# Patient Record
Sex: FEMALE | Race: OTHER | NOT HISPANIC OR LATINO
[De-identification: names, ages, dates, MRNs, and addresses within clinical notes are randomized per-mention and may not be internally consistent; named-entity substitution may affect disease eponyms.]

---

## 2020-08-22 PROBLEM — Z00.00 ENCOUNTER FOR PREVENTIVE HEALTH EXAMINATION: Status: ACTIVE | Noted: 2020-08-22

## 2020-08-24 ENCOUNTER — APPOINTMENT (OUTPATIENT)
Dept: ORTHOPEDIC SURGERY | Facility: CLINIC | Age: 40
End: 2020-08-24

## 2020-08-28 ENCOUNTER — APPOINTMENT (OUTPATIENT)
Dept: ORTHOPEDIC SURGERY | Facility: CLINIC | Age: 40
End: 2020-08-28
Payer: MEDICAID

## 2020-08-28 ENCOUNTER — RESULT REVIEW (OUTPATIENT)
Age: 40
End: 2020-08-28

## 2020-08-28 ENCOUNTER — OUTPATIENT (OUTPATIENT)
Dept: OUTPATIENT SERVICES | Facility: HOSPITAL | Age: 40
LOS: 1 days | End: 2020-08-28
Payer: COMMERCIAL

## 2020-08-28 PROCEDURE — 20611 DRAIN/INJ JOINT/BURSA W/US: CPT | Mod: LT

## 2020-08-28 PROCEDURE — 73564 X-RAY EXAM KNEE 4 OR MORE: CPT

## 2020-08-28 PROCEDURE — 99204 OFFICE O/P NEW MOD 45 MIN: CPT | Mod: 25

## 2020-08-28 PROCEDURE — 73564 X-RAY EXAM KNEE 4 OR MORE: CPT | Mod: 26,50

## 2020-08-28 NOTE — HISTORY OF PRESENT ILLNESS
[de-identified] : The patient is a 39 year old woman presenting with left knee pain.\par \par The patient presents with a several month history of atraumatic, left lateral knee pain.  She recalls a remote sporting injury during childhood, but did not require surgery.  The patient denies new precipitating injury or trauma.  She works as a .  She initially went to an Orthopedist in [place], and had MRI imaging (not available), and had a cortisone injection which provider temporary relief without complication.  The patient denies distal numbness, weakness, paresthesias.\par \par Pain is moderate in intensity, described as sharp, improved with rest, worse with walking.\par

## 2020-08-28 NOTE — DISCUSSION/SUMMARY
[Medication Risks Reviewed] : Medication risks reviewed [de-identified] : The patient is a 39 year old woman presenting with a several month history of atraumatic, left lateral knee pain.  Her pain is likely secondary to knee osteoarthritis, mainly lateral compartment.\par \par Imaging/Diagnostics were interpreted and results were reviewed with the patient in detail.  All questions were answered appropriately.\par \par I discussed the treatment of degenerative arthritis with the patient at length today. I described the spectrum of treatment from nonoperative modalities to total joint arthroplasty. Noninvasive and nonoperative treatment modalities include weight reduction, activity modification with low impact exercise, PRN use of acetaminophen or anti-inflammatory medication if tolerated, natural supplements such as glucosamine/chondroitin, and physical therapy. Further treatments can include corticosteroid injection, hyaluronic acid injections, and orthobiologic such as PRP. Definitive treatment can certainly include total joint arthroplasty, but patient would require surgical consultation to discuss that option further. The risks and benefits of each treatment options was discussed and all questions were answered.\par \par After informed consent, and explanation of risks, benefits, alternatives, adverse effects of injection, which includes but is not limited to infection, bleeding, allergic reaction, swelling, soft tissue weakening/tendon rupture, injection site complication, fat atrophy, skin depigmentation, failure to improve symptoms, the patient would like to proceed with the procedure - LEFT KNEE ULTRASOUND-GUIDED CORTICOSTEROID INJECTION. See procedure note above. Patient tolerated the procedure well. The patient was provided with postinjection instructions.\par \par Patient was prescribed a course of physical therapy today.  The patient was also provided some general home exercises.  The patient was counseled on activity modification.\par \par She was given prescription for lateral  brace.\par \par She understands that she may require surgery in the future.\par \par We will trial course of HA injections.  Orthovisc injections ordered today for LEFT knee.\par \par ------------------------------------------------------------------------------------------------------------------\par Patient appreciates and agrees with current plan.\par \par This note was generated using a mixture of manual typing and dragon medical dictation software.  A reasonable effort has been made for proofreading its contents, but typos may still remain.  If there are any questions or points of clarification needed please notify my office.\par \par >45 minutes of time was spent on total encounter.  >50% of the visit was spent on counseling/coordination of care and medical-decision making for this patient.\par \par \par

## 2020-08-28 NOTE — PHYSICAL EXAM
[de-identified] : General: Well-nourished, well-developed, alert, and in no acute distress, OBESE\par Head: Normocephalic.\par Eyes: Pupils equal round reactive to light and accommodation, extraocular muscles intact, normal sclera.\par Nose: No nasal discharge.\par Cardiac: Regular rate. Extremities are warm and well perfused. Distal pulses are symmetric bilaterally.\par Respiratory: No labored breathing.\par Extremities: Sensation is intact distally bilaterally.  Distal pulses are symmetric bilaterally\par Lymphatic: No regional lymphadenopathy, no lymphedema\par Neurologic: No focal deficits\par Skin: Normal skin color, texture, and turgor\par Psychiatric: Normal affect\par MSK: as noted above/below\par \par RIGHT KNEE:\par \par Inspection: no bruising, swelling, erythema\par Joint Effusion:no \par ROM: Knee Flexion 130 , Knee Extension 0\par Palpation:No pain at joint line, patellar tendon, MFC/LFC, Medial/Lateral Tibial Plateau\par Leg Length Discrepancy:no\par Patella: no apprehension\par Distal Pulses: normal\par Lower Extremity Strength:normal, 5/5 \par Lower Extremity Reflexes:normal, 2+\par Lower Extremity Sensation: normal\par \par Special Tests:\par [facility]:Negative \par Garett: Negative\par Anterior Drawer:Negative\par Anterior Lachman:Negative\par Varus/Valgus:Negative, no instability\par \par LEFT KNEE:\par \par Inspection: no bruising, erythema\par Joint Effusion:TRACE \par ROM: Knee Flexion 120 DEGREES WITH PAIN AT END FLEXION , Knee Extension 0\par Palpation:LATERAL JOINT LINE PAIN, MILD MEDIAL JOINT LINE PAIN, PATELLAR FACET TENDERNESS\par Leg Length Discrepancy:no\par Patella: no apprehension, +COMPRESSION\par Distal Pulses: normal\par Lower Extremity Strength:normal, 5/5 \par Lower Extremity Reflexes:normal, 2+\par Lower Extremity Sensation: normal\par \par Special Tests:\par [facility]:POSITIVE LATERALLY\par Garett: Negative\par Anterior Drawer:Negative\par Posterior Drawer:Negative \par Varus/Valgus:Negative, no instability\par \par \par  [de-identified] : Xray Bilateral Knees - Multiple views were reviewed with the patient in detail. \par \par \par IMPRESSION: Frontal, lateral, flexion and patellar views of the right knee are negative for fracture or dislocation. Joint spaces are preserved. \par \par Frontal, lateral, flexion and patellar views of the left knee demonstrate severe narrowing of the lateral compartment with periarticular osteophytosis. No fracture. No dislocation. \par \par

## 2020-08-28 NOTE — PROCEDURE
[de-identified] : Ultrasound-Guided LEFT Knee Injection\par \par Indication for U/S Guidance: Ensure placement within the tibiofemoral joint for diagnostic purposes, while avoiding neurovascular structures\par \par Indication for Injection: KNEE OSTEOARTHRITIS\par \par A discussion was had with the patient regarding this procedure and all questions were answered. All risks, benefits and alternatives were discussed. These included but were not limited to bleeding, infection, and allergic reaction. A timeout was done to ensure correct side and pt agreed to the procedure. Betadine was used to sterilize and prep the area, and alcohol was used to clean the skin in the anterior aspect of the knee joint. The suprapatellar space was visualized utilizing the Sonosite, linear transducer. The joint was visualized in the short axis and an in-plane approach was used for the injection. Ultrasound guidance was utilized to ensure accuracy of the intra-articular injection and avoid the neurovascular structures. A 25-gauge 1.5" needle was used to inject 4cc of 1% lidocaine without epi into the SubQ.  This was followed by injection with 1CC OF BETAMETHASONE.  A sterile bandage was then applied. The patient tolerated the procedure well and there were no complications.\par

## 2020-10-09 ENCOUNTER — APPOINTMENT (OUTPATIENT)
Dept: ORTHOPEDIC SURGERY | Facility: CLINIC | Age: 40
End: 2020-10-09
Payer: MEDICAID

## 2020-10-09 PROCEDURE — 99213 OFFICE O/P EST LOW 20 MIN: CPT

## 2020-10-12 NOTE — HISTORY OF PRESENT ILLNESS
[de-identified] : The patient is a 39 year old woman presenting for follow-up for left knee pain.\par \par The patient was last seen about 6 weeks ago for a several month history of atraumatic, left lateral knee pain.  She was found to have knee osteoarthritis and had a cortisone injection.  HA injections were not covered by her insurance.  Her pain symptoms improved moderately with cortisone injection.  She was not able to use the  brace.\par \par Previous History: She recalls a remote sporting injury during childhood, but did not require surgery.  The patient denies new precipitating injury or trauma.  She works as a .  She initially went to an Orthopedist in [place], and had MRI imaging (not available), and had a cortisone injection which provider temporary relief without complication.  The patient denies distal numbness, weakness, paresthesias.\par \par Pain is moderate in intensity, described as sharp, improved with rest, worse with walking.\par

## 2020-10-12 NOTE — PHYSICAL EXAM
[de-identified] : General: Well-nourished, well-developed, alert, and in no acute distress, OBESE\par Head: Normocephalic.\par Eyes: Pupils equal round reactive to light and accommodation, extraocular muscles intact, normal sclera.\par Nose: No nasal discharge.\par Cardiac: Regular rate. Extremities are warm and well perfused. Distal pulses are symmetric bilaterally.\par Respiratory: No labored breathing.\par Extremities: Sensation is intact distally bilaterally.  Distal pulses are symmetric bilaterally\par Lymphatic: No regional lymphadenopathy, no lymphedema\par Neurologic: No focal deficits\par Skin: Normal skin color, texture, and turgor\par Psychiatric: Normal affect\par MSK: as noted above/below\par \par RIGHT KNEE:\par \par Inspection: no bruising, swelling, erythema\par Joint Effusion:no \par ROM: Knee Flexion 130 , Knee Extension 0\par Palpation:No pain at joint line, patellar tendon, MFC/LFC, Medial/Lateral Tibial Plateau\par Leg Length Discrepancy:no\par Patella: no apprehension\par Distal Pulses: normal\par Lower Extremity Strength:normal, 5/5 \par Lower Extremity Reflexes:normal, 2+\par Lower Extremity Sensation: normal\par \par Special Tests:\par [facility]:Negative \par Garett: Negative\par Anterior Drawer:Negative\par Anterior Lachman:Negative\par Varus/Valgus:Negative, no instability\par \par LEFT KNEE:\par \par Inspection: no bruising, erythema\par Joint Effusion:TRACE \par ROM: Knee Flexion 130 DEGREES WITH PAIN AT END FLEXION , Knee Extension 0\par Palpation:LATERAL JOINT LINE PAIN, MILD MEDIAL JOINT LINE PAIN, PATELLAR FACET TENDERNESS\par Leg Length Discrepancy:no\par Patella: no apprehension\par Distal Pulses: normal\par Lower Extremity Strength:normal, 5/5 \par Lower Extremity Reflexes:normal, 2+\par Lower Extremity Sensation: normal\par \par Special Tests:\par [facility]:POSITIVE LATERALLY\par Garett: Negative\par Anterior Drawer:Negative\par Posterior Drawer:Negative \par Varus/Valgus:Negative, no instability\par \par \par

## 2020-10-12 NOTE — DISCUSSION/SUMMARY
[Medication Risks Reviewed] : Medication risks reviewed [de-identified] : The patient is a 39 year old woman presenting with a several month history of atraumatic, left lateral knee pain.  Her pain is likely secondary to knee osteoarthritis, mainly lateral compartment.\par \par Imaging/Diagnostics were interpreted and results were reviewed with the patient in detail.  All questions were answered appropriately.\par \par We again discussed nonoperative and operative treatment options.  Patient understands that she may require surgery in the future.\par \par Patient was prescribed a course of physical therapy today.  The patient was also provided some general home exercises.  The patient was counseled on activity modification.\par \par She was given prescription for hinged knee brace.\par \par Patient was prescribed a short course of Meloxicam .Appropriate use of medication was reviewed with the patient in detail. Risks, benefits, and adverse effects medication were discussed.\par \par I informed her that it is too soon to consider repeat cortisone injection.  She may consider in 6 weeks.\par \par \par ------------------------------------------------------------------------------------------------------------------\par Patient appreciates and agrees with current plan.\par \par This note was generated using a mixture of manual typing and dragon medical dictation software.  A reasonable effort has been made for proofreading its contents, but typos may still remain.  If there are any questions or points of clarification needed please notify my office.\par \par >15 minutes of time was spent on total encounter.  >50% of the visit was spent on counseling/coordination of care and medical-decision making for this patient.\par \par \par

## 2020-11-13 ENCOUNTER — APPOINTMENT (OUTPATIENT)
Dept: ORTHOPEDIC SURGERY | Facility: CLINIC | Age: 40
End: 2020-11-13
Payer: MEDICAID

## 2020-11-13 VITALS
TEMPERATURE: 98 F | HEIGHT: 60 IN | HEART RATE: 90 BPM | WEIGHT: 160 LBS | OXYGEN SATURATION: 98 % | SYSTOLIC BLOOD PRESSURE: 130 MMHG | DIASTOLIC BLOOD PRESSURE: 80 MMHG | BODY MASS INDEX: 31.41 KG/M2

## 2020-11-13 PROCEDURE — 99072 ADDL SUPL MATRL&STAF TM PHE: CPT

## 2020-11-13 PROCEDURE — 20611 DRAIN/INJ JOINT/BURSA W/US: CPT | Mod: LT

## 2020-11-13 RX ORDER — HYALURONATE SODIUM 30 MG/2 ML
30 SYRINGE (ML) INTRAARTICULAR
Qty: 3 | Refills: 0 | Status: DISCONTINUED | COMMUNITY
Start: 2020-08-28 | End: 2020-11-13

## 2020-11-13 NOTE — PROCEDURE
[de-identified] : Ultrasound-Guided LEFT Knee Injection\par \par Indication for U/S Guidance: Ensure placement within the tibiofemoral joint for diagnostic purposes, while avoiding neurovascular structures\par \par Indication for Injection: Knee Osteoarthritis\par \par A discussion was had with the patient regarding this procedure and all questions were answered. All risks, benefits and alternatives were discussed. These included but were not limited to bleeding, infection, and allergic reaction. A timeout was done to ensure correct side and pt agreed to the procedure. Betadine was used to sterilize and prep the area, and alcohol was used to clean the skin in the anterior aspect of the knee joint. The suprapatellar space was visualized utilizing the Sonosite, linear transducer. The joint was visualized in the short axis and an in-plane approach was used for the injection. Ultrasound guidance was utilized to ensure accuracy of the intra-articular injection and avoid the neurovascular structures. A 25-gauge 1.5" needle was used to inject 2cc of 1% lidocaine without epi into the SubQ.  This was followed by injection with 1cc of 1% lidocaine without epinephrine, 1cc of 0.5% bupivicaine and 1cc of KENALOG.. A sterile bandage was then applied. The patient tolerated the procedure well and there were no complications.\par \par \par

## 2021-02-08 ENCOUNTER — APPOINTMENT (OUTPATIENT)
Dept: ORTHOPEDIC SURGERY | Facility: CLINIC | Age: 41
End: 2021-02-08

## 2021-02-16 ENCOUNTER — APPOINTMENT (OUTPATIENT)
Dept: ORTHOPEDIC SURGERY | Facility: CLINIC | Age: 41
End: 2021-02-16

## 2021-02-18 ENCOUNTER — APPOINTMENT (OUTPATIENT)
Dept: ORTHOPEDIC SURGERY | Facility: CLINIC | Age: 41
End: 2021-02-18

## 2021-02-22 ENCOUNTER — APPOINTMENT (OUTPATIENT)
Dept: ORTHOPEDIC SURGERY | Facility: CLINIC | Age: 41
End: 2021-02-22

## 2021-02-25 ENCOUNTER — APPOINTMENT (OUTPATIENT)
Dept: ORTHOPEDIC SURGERY | Facility: CLINIC | Age: 41
End: 2021-02-25
Payer: MEDICAID

## 2021-02-25 PROCEDURE — 99072 ADDL SUPL MATRL&STAF TM PHE: CPT

## 2021-02-25 PROCEDURE — 20610 DRAIN/INJ JOINT/BURSA W/O US: CPT | Mod: LT

## 2021-02-25 PROCEDURE — 99212 OFFICE O/P EST SF 10 MIN: CPT | Mod: 25

## 2021-02-25 NOTE — PHYSICAL EXAM
[de-identified] : General: Well-nourished, well-developed, alert, and in no acute distress, OBESE\par Head: Normocephalic.\par Eyes: Pupils equal round reactive to light and accommodation, extraocular muscles intact, normal sclera.\par Nose: No nasal discharge.\par Cardiac: Regular rate. Extremities are warm and well perfused. Distal pulses are symmetric bilaterally.\par Respiratory: No labored breathing.\par Extremities: Sensation is intact distally bilaterally.  Distal pulses are symmetric bilaterally\par Lymphatic: No regional lymphadenopathy, no lymphedema\par Neurologic: No focal deficits\par Skin: Normal skin color, texture, and turgor\par Psychiatric: Normal affect\par MSK: as noted above/below\par \par RIGHT KNEE:\par \par Inspection: no bruising, swelling, erythema\par Joint Effusion:no \par ROM: Knee Flexion 130 , Knee Extension 0\par Palpation:No pain at joint line, patellar tendon, MFC/LFC, Medial/Lateral Tibial Plateau\par Leg Length Discrepancy:no\par Patella: no apprehension\par Distal Pulses: normal\par Lower Extremity Strength:normal, 5/5 \par Lower Extremity Reflexes:normal, 2+\par Lower Extremity Sensation: normal\par \par Special Tests:\par [facility]:Negative \par Garett: Negative\par Anterior Drawer:Negative\par Anterior Lachman:Negative\par Varus/Valgus:Negative, no instability\par \par LEFT KNEE:\par \par Inspection: no bruising, erythema\par Joint Effusion:TRACE \par ROM: Knee Flexion 130 DEGREES WITH PAIN AT END FLEXION , Knee Extension 0\par Palpation:LATERAL JOINT LINE PAIN, MILD MEDIAL JOINT LINE PAIN, PATELLAR FACET TENDERNESS\par Leg Length Discrepancy:no\par Patella: no apprehension\par Distal Pulses: normal\par Lower Extremity Strength:normal, 5/5 \par Lower Extremity Reflexes:normal, 2+\par Lower Extremity Sensation: normal\par \par Special Tests:\par [facility]:POSITIVE LATERALLY\par Garett: Negative\par Anterior Drawer:Negative\par Posterior Drawer:Negative \par Varus/Valgus:Negative, no instability\par \par \par

## 2021-02-25 NOTE — PROCEDURE
[de-identified] : Injection: Left knee joint.\par Indication: Osteoarthritis.\par \par A discussion was had with the patient regarding this procedure and all questions were answered. All risks, benefits and alternatives were discussed. These included but were not limited to bleeding, infection, allergic reaction and reaccumulation of fluid. A timeout was done to ensure correct side and pt agreed to the procedure.  Alcohol was used to clean the skin, and betadine was used to sterilize and prep the area in the lateral joint line aspect of the knee. Ethyl chloride spray was then used as a topical anesthetic. A 22-gauge needle was used to inject 2cc of 1% lidocaine without epinephrine and 1cc of  KENALOGinto the knee. A sterile bandage was then applied. The patient tolerated the procedure well.\par

## 2021-02-25 NOTE — DISCUSSION/SUMMARY
[de-identified] : The patient is a 39 year old woman presenting with a several month history of atraumatic, left lateral knee pain.  Her pain is likely secondary to knee osteoarthritis, mainly lateral compartment.\par \par We again discussed the natural progression of knee OA, and treatment options.  Patient understands that she may require surgery in the future.\par \par After informed consent, and explanation of risks, benefits, alternatives, adverse effects of injection, which includes but is not limited to infection, bleeding, allergic reaction, swelling, soft tissue weakening/tendon rupture, injection site complication, fat atrophy, skin depigmentation, failure to improve symptoms, the patient would like to proceed with the procedure - LEFT KNEE CORTICOSTEROID INJECTION. See procedure note above. Patient tolerated the procedure well. The patient was provided with postinjection instructions.\par \par She was counseled on activity modification, weight loss, bracing, NSAIDs PRN.\par \par Patient was prescribed a refill of Meloxicam.Appropriate use of medication was reviewed with the patient in detail. Risks, benefits, and adverse effects medication were discussed.\par \par Follow-up in 2-3 months.\par \par \par ------------------------------------------------------------------------------------------------------------------\par Patient appreciates and agrees with current plan.\par \par This note was generated using a mixture of manual typing and dragon medical dictation software.  A reasonable effort has been made for proofreading its contents, but typos may still remain.  If there are any questions or points of clarification needed please notify my office.\par \par >15 minutes of time was spent on total encounter.  >50% of the visit was spent on counseling/coordination of care and medical-decision making for this patient.\par \par \par

## 2021-02-25 NOTE — HISTORY OF PRESENT ILLNESS
[de-identified] : The patient is a 39 year old woman presenting for follow-up for left knee pain.\par \par The patient was last seen about 4 months ago for a several month history of atraumatic, left lateral knee pain.  She was found to have knee osteoarthritis and had a cortisone injection.  HA injections were not covered by her insurance.  Her pain symptoms improved moderately with cortisone injection.  We discussed PT, hinged knee brace, occasional NSAIDs.  She has not been using the knee brace.\par \par Previous History: She recalls a remote sporting injury during childhood, but did not require surgery.  The patient denies new precipitating injury or trauma.  She works as a .  She initially went to an Orthopedist in [place], and had MRI imaging (not available), and had a cortisone injection which provider temporary relief without complication.  The patient denies distal numbness, weakness, paresthesias.\par \par Pain is moderate in intensity, described as sharp, improved with rest, worse with walking.\par

## 2021-05-04 ENCOUNTER — APPOINTMENT (OUTPATIENT)
Dept: ORTHOPEDIC SURGERY | Facility: CLINIC | Age: 41
End: 2021-05-04

## 2021-07-06 ENCOUNTER — APPOINTMENT (OUTPATIENT)
Dept: ORTHOPEDIC SURGERY | Facility: CLINIC | Age: 41
End: 2021-07-06
Payer: MEDICAID

## 2021-07-06 VITALS
WEIGHT: 160 LBS | HEIGHT: 60 IN | SYSTOLIC BLOOD PRESSURE: 120 MMHG | OXYGEN SATURATION: 97 % | BODY MASS INDEX: 31.41 KG/M2 | HEART RATE: 97 BPM | DIASTOLIC BLOOD PRESSURE: 70 MMHG | TEMPERATURE: 98.7 F

## 2021-07-06 PROCEDURE — 20611 DRAIN/INJ JOINT/BURSA W/US: CPT | Mod: LT

## 2021-07-06 PROCEDURE — 99212 OFFICE O/P EST SF 10 MIN: CPT | Mod: 25

## 2021-07-06 NOTE — PHYSICAL EXAM
[de-identified] : General: Well-nourished, well-developed, alert, and in no acute distress, OBESE\par Head: Normocephalic.\par Eyes: Pupils equal round reactive to light and accommodation, extraocular muscles intact, normal sclera.\par Nose: No nasal discharge.\par Cardiac: Regular rate. Extremities are warm and well perfused. Distal pulses are symmetric bilaterally.\par Respiratory: No labored breathing.\par Extremities: Sensation is intact distally bilaterally.  Distal pulses are symmetric bilaterally\par Lymphatic: No regional lymphadenopathy, no lymphedema\par Neurologic: No focal deficits\par Skin: Normal skin color, texture, and turgor\par Psychiatric: Normal affect\par MSK: as noted above/below\par \par RIGHT KNEE:\par \par Inspection: no bruising, swelling, erythema\par Joint Effusion:no \par ROM: Knee Flexion 130 , Knee Extension 0\par Palpation:No pain at joint line, patellar tendon, MFC/LFC, Medial/Lateral Tibial Plateau\par Leg Length Discrepancy:no\par Patella: no apprehension\par Distal Pulses: normal\par Lower Extremity Strength:normal, 5/5 \par Lower Extremity Reflexes:normal, 2+\par Lower Extremity Sensation: normal\par \par Special Tests:\par [facility]:Negative \par Garett: Negative\par Anterior Drawer:Negative\par Anterior Lachman:Negative\par Varus/Valgus:Negative, no instability\par \par LEFT KNEE:\par \par Inspection: no bruising, erythema\par Joint Effusion:TRACE \par ROM: Knee Flexion 130 DEGREES WITH PAIN AT END FLEXION , Knee Extension 0\par Palpation:LATERAL JOINT LINE PAIN, MILD MEDIAL JOINT LINE PAIN, PATELLAR FACET TENDERNESS\par Leg Length Discrepancy:no\par Patella: no apprehension\par Distal Pulses: normal\par Lower Extremity Strength:normal, 5/5 \par Lower Extremity Reflexes:normal, 2+\par Lower Extremity Sensation: normal\par \par Special Tests:\par [facility]:POSITIVE LATERALLY\par Garett: Negative\par Anterior Drawer:Negative\par Posterior Drawer:Negative \par Varus/Valgus:Negative, no instability\par \par \par

## 2021-07-06 NOTE — PROCEDURE
[de-identified] : Ultrasound-Guided LEFT Knee Injection\par \par Indication for U/S Guidance: Ensure placement within the tibiofemoral joint for diagnostic purposes, while avoiding neurovascular structures\par \par Indication for Injection: KNEE OSTEOARTHRITIS\par \par A discussion was had with the patient regarding this procedure and all questions were answered. All risks, benefits and alternatives were discussed. These included but were not limited to bleeding, infection, and allergic reaction. A timeout was done to ensure correct side and pt agreed to the procedure. Betadine was used to sterilize and prep the area, and alcohol was used to clean the skin in the anterior aspect of the knee joint. The suprapatellar space was visualized utilizing the Sonosite, linear transducer. The joint was visualized in the short axis and an in-plane approach was used for the injection. Ultrasound guidance was utilized to ensure accuracy of the intra-articular injection and avoid the neurovascular structures. A 22-gauge 1.5" needle was used to inject 2cc of 1% lidocaine without epi into the SubQ.  This was followed by injection with 2cc of 1% lidocaine without epi, 0.5% bupivicaine and 1CC OF KENALOG.  A sterile bandage was then applied. The patient tolerated the procedure well and there were no complications.\par \par \par \par

## 2021-07-06 NOTE — DISCUSSION/SUMMARY
[de-identified] : The patient is a 39 year old woman presenting with chronic, atraumatic, left lateral knee pain.  Her pain is likely secondary to knee osteoarthritis, mainly lateral compartment.\par \par The patient was counseled on the natural progression of the problem(s) today and potential complications of diagnoses.  The patient was provided reassurance today.\par \par We again discussed nonoperative and operative treatment options for knee OA.  \par \par Patient understands that they may require surgery in the future.\par \par After informed consent, and explanation of risks, benefits, alternatives, adverse effects of injection, which includes but is not limited to infection, bleeding, allergic reaction, swelling, soft tissue weakening/tendon rupture, injection site complication, fat atrophy, skin depigmentation, failure to improve symptoms, the patient would like to proceed with the procedure - LEFT KNEE ULTRASOUND-GUIDED CORTICOSTEROID INJECTION. See procedure note above. Patient tolerated the procedure well. The patient was provided with postinjection instructions.\par \par Follow-up in 3-6 months or PRN.\par ------------------------------------------------------------------------------------------------------------------\par Patient appreciates and agrees with current plan.\par \par This note was generated using a mixture of manual typing and dragon medical dictation software.  A reasonable effort has been made for proofreading its contents, but typos may still remain.  If there are any questions or points of clarification needed please notify my office.\par \par >15 minutes of time was spent on total encounter.  >50% of the visit was spent on counseling/coordination of care and medical-decision making for this patient.\par \par \par

## 2021-07-06 NOTE — HISTORY OF PRESENT ILLNESS
[de-identified] : The patient is a 39 year old woman presenting for follow-up for left knee pain.\par \par The patient was last seen about 5 months ago for chronic, atraumatic, left lateral knee pain.  She was found to have knee osteoarthritis and has had cortisone injections in the past.  HA injections were not covered by her insurance.  Her pain symptoms improved moderately with cortisone injection.  We discussed PT, hinged knee brace, occasional NSAIDs.  She has not been using the knee brace.\par \par Previous History: She recalls a remote sporting injury during childhood, but did not require surgery.  The patient denies new precipitating injury or trauma.  She works as a .  She initially went to an Orthopedist in [place], and had MRI imaging (not available), and had a cortisone injection which provider temporary relief without complication.  The patient denies distal numbness, weakness, paresthesias.\par \par Pain is moderate in intensity, described as sharp, improved with rest, worse with walking.\par

## 2021-07-13 ENCOUNTER — OUTPATIENT (OUTPATIENT)
Dept: OUTPATIENT SERVICES | Facility: HOSPITAL | Age: 41
LOS: 1 days | End: 2021-07-13
Payer: COMMERCIAL

## 2021-07-13 ENCOUNTER — APPOINTMENT (OUTPATIENT)
Dept: ORTHOPEDIC SURGERY | Facility: CLINIC | Age: 41
End: 2021-07-13
Payer: MEDICAID

## 2021-07-13 ENCOUNTER — RESULT REVIEW (OUTPATIENT)
Age: 41
End: 2021-07-13

## 2021-07-13 VITALS
HEART RATE: 89 BPM | SYSTOLIC BLOOD PRESSURE: 128 MMHG | WEIGHT: 160 LBS | BODY MASS INDEX: 31.41 KG/M2 | DIASTOLIC BLOOD PRESSURE: 70 MMHG | HEIGHT: 60 IN | TEMPERATURE: 97.8 F | OXYGEN SATURATION: 97 %

## 2021-07-13 PROCEDURE — 73030 X-RAY EXAM OF SHOULDER: CPT

## 2021-07-13 PROCEDURE — 20611 DRAIN/INJ JOINT/BURSA W/US: CPT | Mod: RT

## 2021-07-13 PROCEDURE — 99212 OFFICE O/P EST SF 10 MIN: CPT | Mod: 25

## 2021-07-13 PROCEDURE — 73030 X-RAY EXAM OF SHOULDER: CPT | Mod: 26,RT

## 2021-07-13 NOTE — PROCEDURE
[de-identified] : Ultrasound Guided Injection \par Indication: Ensure placement within the subacromial-subdeltoid space,  without damaging the tendons\par \par Utlizing the [email or web address] portable ultrasound machine, the Linear transducer, sterilized probe, using ultrasound guidance with the probe in the longitudinal axis, utilizing an in plane approach, was used for the following injection:\par \par Injection: RIGHT SUBACROMIAL-SUBDELTOID SPACE INJECTION\par Indication: SUBACROMIAL BURSITIS\par \par A discussion was had with the patient regarding this procedure and all questions were answered. All risks, benefits and alternatives were discussed. These included but were not limited to bleeding, infection, and allergic reaction. A timeout was performed prior to the procedure to ensure proper side.  Utilizing ultrasound guidance, the subacromial space was visualized.  Alcohol was used to clean and sterilize the skin over the anterior aspect of the shoulder. A 25-gauge needle was used to inject 4cc of lidocaine 1% without epinephrine and 1cc of DEPOMEDROL into the subacromial-subdeltoid space.   A sterile bandage was then applied. The patient tolerated the procedure well and there were no complications.\par

## 2021-07-13 NOTE — PHYSICAL EXAM
[de-identified] : General: Well-nourished, well-developed, alert, and in no acute distress.\par Head: Normocephalic.\par Eyes: Pupils equal round reactive to light and accommodation, extraocular muscles intact, normal sclera.\par Nose: No nasal discharge.\par Cardiac: Regular rate. Extremities are warm and well perfused. Distal pulses are symmetric bilaterally.\par Respiratory: No labored breathing.\par Extremities: Sensation is intact distally bilaterally.  Distal pulses are symmetric bilaterally\par Lymphatic: No regional lymphadenopathy, no lymphedema\par Neurologic: No focal deficits\par Skin: Normal skin color, texture, and turgor\par Psychiatric: Normal affect\par MSK: as noted above/below\par \par \par \par RIGHT SHOULDER:\par  \par Inspection:no bruising, rash, erythema, deformity\par Joint Effusion:no\par ROM:MILD DECREASED, FF/ABDUCTION ~160, ER ~70, IR TO HIP\par Palpation: PAIN AT SUBACROMIAL AREA, PAIN AT GH JOINT, NO AC joint pain, Bicipital Groove Pain , Clavicle pain , GT pain \par Distal Pulses:normal\par Upper Extremity Reflexes:2+\par Shoulder Strength: 5/5 \par Upper Extremity Sensation: normal\par \par Special Tests:\par Empty Can: PAINFUL\par Cross Arm: POSITIVE\par Neer: POSITIVE\par Griffin: POSITIVE\par Speed: Negative\par \par \par LEFT SHOULDER:\par  \par Inspection:no bruising, rash, erythema, deformity\par Joint Effusion:no\par ROM:normal Forward Flexion, Abduction , Internal Rotation: , External Rotation \par Palpation: NO AC joint pain, Bicipital Groove Pain , GH joint pain , Clavicle pain , GT pain \par Distal Pulses:normal\par Upper Extremity Reflexes:2+\par Shoulder Strength: 5/5 \par Upper Extremity Sensation: normal\par \par Special Tests:\par Empty Can: Negative \par Lift-Off Test: Negative \par Cross Arm: Negative\par Neer: Negative\par Griffin: Negative\par Speed: Negative\par \par  [de-identified] : Xray RIGHT Shoulder - Multiple views were reviewed with the patient in detail.  There is no acute fracture or dislocation.  There is no joint effusion.  Joint spaces are preserved. We will await formal radiology reading.\par \par

## 2021-07-13 NOTE — HISTORY OF PRESENT ILLNESS
[de-identified] : The patient is a 40 year old, rhd woman presenting with right shoulder pain.\par \par She is currently unemployed.\par \par She presents with chronic, atraumatic right shoulder pain.  The patient denies precipitating injury or trauma.  She recalls seeing Orthopedics about 3 months ago, with diagnosis of tendinitis.  The patient denies distal numbness, weakness, paresthesias.  Pain is worse when sleeping on the affected side.  She tried PT short term, but did not dedicate enough time to PT.  NSAIDs have not improved pain significantly.\par \par Pain is moderate in intensity, described as sharp, improved with rest, worse with movement.

## 2021-07-13 NOTE — REASON FOR VISIT
[Initial Visit] : an initial visit for [Shoulder Pain] : shoulder pain [Time Spent: ____ minutes] : Total time spent using  services: [unfilled] minutes. The patient's primary language is not English thus required  services. [FreeTextEntry1] : [id number] and [id number] [FreeTextEntry2] :  1 Ping.  2 Jett. [FreeTextEntry3] : Patient was seen by Dr. Farris twice in one visit. Two different interpreters. [TWNoteComboBox1] : cO

## 2021-07-13 NOTE — DISCUSSION/SUMMARY
[Medication Risks Reviewed] : Medication risks reviewed [de-identified] : The patient is a 40 year old, rhd woman presenting with chronic right shoulder pain.  Suspect subacromial bursitis/impingement.  DDx also includes adhesive capsulitis.\par \par Imaging/Diagnostics/Medical Records were interpreted and/or reviewed and results were reviewed with the patient in detail.  All questions were answered appropriately.\par \par The patient was counseled on the natural progression of the problem(s) today and potential complications of diagnoses.  The patient was provided reassurance today.\par \par After informed consent, and explanation of risks, benefits, alternatives, adverse effects of injection, which includes but is not limited to infection, bleeding, allergic reaction, swelling, soft tissue weakening/tendon rupture, injection site complication, fat atrophy, skin depigmentation, failure to improve symptoms, the patient would like to proceed with the procedure - RIGHT SUBACROMIAL BURSA ULTRASOUND-GUIDED CORTICOSTEROID INJECTION. See procedure note above. Patient tolerated the procedure well. The patient was provided with postinjection instructions.\par \par \par Patient was prescribed a course of physical therapy today.  The patient was also provided some general home exercises.  The patient was counseled on activity modification.\par \par Follow-up in 6 weeks.\par \par ------------------------------------------------------------------------------------------------------------------\par Patient appreciates and agrees with current plan.\par \par The patient's diagnosis above was evaluated by me, personally.  Diagnostic Testing and treatment options were discussed with the patient in detail.  The risks/benefits/potential complications of diagnostic testing/treatments were described in detail.  \par \par This note was generated using a mixture of manual typing and dragon medical dictation software.  A reasonable effort has been made for proofreading its contents, but typos may still remain.  If there are any questions or points of clarification needed please notify my office.\par \par \par >15 minutes of time was spent on total encounter.  >50% of the visit was spent on face-to-face counseling/coordination of care and medical-decision making for this patient.\par \par

## 2021-09-27 NOTE — REASON FOR VISIT
Patient has appointment on 10/19/2021 with Dr Croft for a follow up at 1pm   [FreeTextEntry1] : [id number] [FreeTextEntry2] : Jett [TWNoteComboBox1] : Oc

## 2022-01-24 ENCOUNTER — APPOINTMENT (OUTPATIENT)
Dept: ORTHOPEDIC SURGERY | Facility: CLINIC | Age: 42
End: 2022-01-24
Payer: MEDICAID

## 2022-01-24 VITALS
BODY MASS INDEX: 31.41 KG/M2 | HEART RATE: 83 BPM | WEIGHT: 160 LBS | OXYGEN SATURATION: 97 % | DIASTOLIC BLOOD PRESSURE: 76 MMHG | HEIGHT: 60 IN | SYSTOLIC BLOOD PRESSURE: 130 MMHG | TEMPERATURE: 97.8 F

## 2022-01-24 DIAGNOSIS — Z86.39 PERSONAL HISTORY OF OTHER ENDOCRINE, NUTRITIONAL AND METABOLIC DISEASE: ICD-10-CM

## 2022-01-24 DIAGNOSIS — M17.12 UNILATERAL PRIMARY OSTEOARTHRITIS, LEFT KNEE: ICD-10-CM

## 2022-01-24 DIAGNOSIS — Z78.9 OTHER SPECIFIED HEALTH STATUS: ICD-10-CM

## 2022-01-24 PROCEDURE — 20611 DRAIN/INJ JOINT/BURSA W/US: CPT | Mod: LT

## 2022-01-24 PROCEDURE — 99212 OFFICE O/P EST SF 10 MIN: CPT | Mod: 25

## 2022-01-24 RX ORDER — MELOXICAM 15 MG/1
15 TABLET ORAL
Qty: 30 | Refills: 1 | Status: COMPLETED | COMMUNITY
Start: 2020-10-09 | End: 2022-01-24

## 2022-01-24 NOTE — DISCUSSION/SUMMARY
[Medication Risks Reviewed] : Medication risks reviewed [de-identified] : The patient is a 40 year old, rhd woman presenting with chronic left knee pain secondary to knee osteoarthritis of the lateral compartment.\par \par She also complains of chronic right shoulder pain.  Likely multifactorial secondary to subacromial impingement and adhesive capsulitis.\par \par Overall, patient has poor insight into her diagnoses despite prolonged discussions regarding diagnoses/prognosis/treatment options.\par \par The patient was counseled on the natural progression of the problem(s) today and potential complications of diagnoses.  The patient was provided reassurance today.\par \par After informed consent, and explanation of risks, benefits, alternatives, adverse effects of injection, which includes but is not limited to infection, bleeding, allergic reaction, swelling, soft tissue weakening/tendon rupture, neurovascular injury, injection site complication, fat atrophy, skin depigmentation, failure to improve symptoms, the patient would like to proceed with the procedure - LEFT KNEE ULTRASOUND-GUIDED CORTICOSTEROID INJECTION. See procedure note above. Patient tolerated the procedure well. The patient was provided with postinjection instructions.\par \par Patient advised to monitor glucose carefully over next 1-2 weeks.\par \par If stable, RTC in 2 weeks for potential injection of right shoulder.\par \par She is not seeking surgery at this time.\par \par ------------------------------------------------------------------------------------------------------------------\par Patient appreciates and agrees with current plan.\par \par The patient's diagnosis above was evaluated by me, personally.  Diagnostic Testing and treatment options were discussed with the patient in detail.  The risks/benefits/potential complications of diagnostic testing/treatments were described in detail.  \par \par This note was generated using a mixture of manual typing and dragon medical dictation software.  A reasonable effort has been made for proofreading its contents, but typos may still remain.  If there are any questions or points of clarification needed please notify my office.\par \par \par >15 minutes of time was spent on total encounter.  >50% of the visit was spent on face-to-face counseling/coordination of care and medical-decision making for this patient.\par \par

## 2022-01-24 NOTE — PROCEDURE
[de-identified] : Ultrasound-Guided LEFT Knee Injection\par \par Indication for U/S Guidance: Ensure placement within the tibiofemoral joint for diagnostic purposes, while avoiding neurovascular structures\par \par Indication for Injection: KNEE OSTEOARTHRITIS\par \par A discussion was had with the patient regarding this procedure and all questions were answered. All risks, benefits and alternatives were discussed. These included but were not limited to bleeding, infection, and allergic reaction. A timeout was done to ensure correct side and pt agreed to the procedure. Betadine was used to sterilize and prep the area, and alcohol was used to clean the skin in the anterior aspect of the knee joint. The suprapatellar space was visualized utilizing the [employer], linear transducer. The joint was visualized in the short axis and an in-plane approach was used for the injection. Ultrasound guidance was utilized to ensure accuracy of the intra-articular injection and avoid the neurovascular structures. A 22-gauge 1.5" needle was used to inject 2cc of 1% lidocaine without epi into the SubQ. This was followed by injection with 2cc of 1% lidocaine without epi, 0.5% bupivicaine and 0.5cc of betamethasone. A sterile bandage was then applied. The patient tolerated the procedure well and there were no complications.

## 2022-01-24 NOTE — HISTORY OF PRESENT ILLNESS
[de-identified] : The patient is a 40 year old, rhd woman presenting for follow-up for right shoulder pain and left knee pain.\par \par She is currently unemployed.\par \par She was last seen about 6 months ago for chronic, atraumatic right shoulder pain.  She recalls seeing Orthopedics in the past, with diagnosis of tendinitis.  The patient denies distal numbness, weakness, paresthesias.  Pain is worse when sleeping on the affected side.  She tried PT short term, but did not dedicate enough time to PT.  NSAIDs have not improved pain significantly.\par \par At her last visit, she had subacromial bursa CSI, and we discussed starting PT.\par \par She also has history of left knee lateral compartment osteoarthritis.  She has tried CSI in the past which provide several months of relief.  She has not been complaint with PT or bracing.  HA injections not covered by her insurance.

## 2022-01-24 NOTE — PHYSICAL EXAM
[de-identified] : General: Well-nourished, well-developed, alert, and in no acute distress.\par Head: Normocephalic.\par Eyes: Pupils equal round reactive to light and accommodation, extraocular muscles intact, normal sclera.\par Nose: No nasal discharge.\par Cardiac: Regular rate. Extremities are warm and well perfused. Distal pulses are symmetric bilaterally.\par Respiratory: No labored breathing.\par Extremities: Sensation is intact distally bilaterally.  Distal pulses are symmetric bilaterally\par Lymphatic: No regional lymphadenopathy, no lymphedema\par Neurologic: No focal deficits\par Skin: Normal skin color, texture, and turgor\par Psychiatric: Normal affect\par MSK: as noted above/below\par \par \par \par RIGHT SHOULDER:\par  \par Inspection:no bruising, rash, erythema, deformity\par Joint Effusion:no\par ROM:MILD DECREASED, FF/ABDUCTION ~160, ER ~70, IR TO HIP\par Palpation: PAIN AT SUBACROMIAL AREA, PAIN AT GH JOINT, NO AC joint pain, Bicipital Groove Pain , Clavicle pain , GT pain \par Distal Pulses:normal\par Upper Extremity Reflexes:2+\par Shoulder Strength: 5/5 \par Upper Extremity Sensation: normal\par \par Special Tests:\par Empty Can: PAINFUL\par Cross Arm: POSITIVE\par Neer: POSITIVE\par Griffin: POSITIVE\par Speed: Negative\par \par \par LEFT SHOULDER:\par  \par Inspection:no bruising, rash, erythema, deformity\par Joint Effusion:no\par ROM:normal Forward Flexion, Abduction , Internal Rotation: , External Rotation \par Palpation: NO AC joint pain, Bicipital Groove Pain , GH joint pain , Clavicle pain , GT pain \par Distal Pulses:normal\par Upper Extremity Reflexes:2+\par Shoulder Strength: 5/5 \par Upper Extremity Sensation: normal\par \par Special Tests:\par Empty Can: Negative \par Lift-Off Test: Negative \par Cross Arm: Negative\par Neer: Negative\par Griffin: Negative\par Speed: Negative\par \par RIGHT KNEE:\par \par Inspection: no bruising, swelling, erythema\par Joint Effusion:no \par ROM: Knee Flexion 130 , Knee Extension 0\par Palpation:No pain at joint line, patellar tendon, MFC/LFC, Medial/Lateral Tibial Plateau\par Leg Length Discrepancy:no\par Patella: no apprehension\par Distal Pulses: normal\par Lower Extremity Strength:normal, 5/5 \par Lower Extremity Reflexes:normal, 2+\par Lower Extremity Sensation: normal\par \par Special Tests:\par Kim:Negative \par Garett: Negative\par Anterior Drawer:Negative\par Anterior Lachman:Negative\par Varus/Valgus:Negative, no instability\par \par LEFT KNEE:\par \par Inspection: no bruising, erythema\par Joint Effusion:TRACE \par ROM: Knee Flexion 130 DEGREES WITH PAIN AT END FLEXION , Knee Extension 0\par Palpation:LATERAL JOINT LINE PAIN, MILD MEDIAL JOINT LINE PAIN, PATELLAR FACET TENDERNESS\par Leg Length Discrepancy:no\par Patella: no apprehension\par Distal Pulses: normal\par Lower Extremity Strength:normal, 5/5 \par Lower Extremity Reflexes:normal, 2+\par Lower Extremity Sensation: normal\par \par Special Tests:\par [facility]:POSITIVE LATERALLY\par Garett: Negative\par Anterior Drawer:Negative\par Posterior Drawer:Negative \par Varus/Valgus:Negative, no instability\par

## 2022-02-07 ENCOUNTER — APPOINTMENT (OUTPATIENT)
Dept: ORTHOPEDIC SURGERY | Facility: CLINIC | Age: 42
End: 2022-02-07
Payer: MEDICAID

## 2022-02-07 VITALS
HEART RATE: 57 BPM | HEIGHT: 60 IN | WEIGHT: 140 LBS | DIASTOLIC BLOOD PRESSURE: 70 MMHG | BODY MASS INDEX: 27.48 KG/M2 | SYSTOLIC BLOOD PRESSURE: 102 MMHG | OXYGEN SATURATION: 98 %

## 2022-02-07 PROCEDURE — 99212 OFFICE O/P EST SF 10 MIN: CPT | Mod: 25

## 2022-02-07 PROCEDURE — 20611 DRAIN/INJ JOINT/BURSA W/US: CPT | Mod: RT

## 2022-02-07 NOTE — PROCEDURE
[de-identified] : Ultrasound Guided Injection \par Indication: Ensure placement within the subacromial-subdeltoid space, without damaging the tendons\par \par Utlizing the [employer] portable ultrasound machine, the Linear transducer, sterilized probe, using ultrasound guidance with the probe in the longitudinal axis, utilizing an in plane approach, was used for the following injection:\par \par Injection: RIGHT SUBACROMIAL-SUBDELTOID SPACE INJECTION\par Indication: SUBACROMIAL BURSITIS\par \par A discussion was had with the patient regarding this procedure and all questions were answered. All risks, benefits and alternatives were discussed. These included but were not limited to bleeding, infection, and allergic reaction. A timeout was performed prior to the procedure to ensure proper side. Utilizing ultrasound guidance, the subacromial space was visualized. Alcohol was used to clean and sterilize the skin over the anterior aspect of the shoulder. A 25-gauge needle was used to inject 4cc of lidocaine 1% without epinephrine, 1cc of 0.25% bupivicaine and 1cc of DEPOMEDROL into the subacromial-subdeltoid space. A sterile bandage was then applied. The patient tolerated the procedure well and there were no complications.

## 2022-02-07 NOTE — REASON FOR VISIT
[Follow-Up Visit] : a follow-up visit for [Shoulder Pain] : shoulder pain [Interpreters_IDNumber] : [id number] [Interpreters_FullName] : Cayden [TWNoteComboBox1] : Oc

## 2022-02-07 NOTE — DISCUSSION/SUMMARY
[Medication Risks Reviewed] : Medication risks reviewed [de-identified] : The patient is a 40 year old, rhd woman presenting with chronic right shoulder pain.  Likely multifactorial secondary to subacromial impingement/bursitis\par \par Overall, patient has poor insight into her diagnoses despite prolonged discussions regarding diagnoses/prognosis/treatment options.\par \par The patient was counseled on the natural progression of the problem(s) today and potential complications of diagnoses.  The patient was provided reassurance today.\par \par She is not seeking surgery at this time.\par \par Advised her of the importantace of an exercise program and highlighted exercises today.\par \par After informed consent, and explanation of risks, benefits, alternatives, adverse effects of injection, which includes but is not limited to infection, bleeding, allergic reaction, swelling, soft tissue weakening/tendon rupture, neurovascular injury, injection site complication, fat atrophy, skin depigmentation, failure to improve symptoms, the patient would like to proceed with the procedure - RIGHT SUBACROMIAL BURSA ULTRASOUND-GUIDED CORTICOSTEROID INJECITON. See procedure note above. Patient tolerated the procedure well. The patient was provided with postinjection instructions.\par \par Follow-up in 3 months.\par \par ------------------------------------------------------------------------------------------------------------------\par Patient appreciates and agrees with current plan.\par \par The patient's diagnosis above was evaluated by me, personally.  Diagnostic Testing and treatment options were discussed with the patient in detail.  The risks/benefits/potential complications of diagnostic testing/treatments were described in detail.  \par \par This note was generated using a mixture of manual typing and dragon medical dictation software.  A reasonable effort has been made for proofreading its contents, but typos may still remain.  If there are any questions or points of clarification needed please notify my office.\par \par \par >15 minutes of time was spent on total encounter.  >50% of the visit was spent on face-to-face counseling/coordination of care and medical-decision making for this patient.\par \par

## 2022-02-07 NOTE — PHYSICAL EXAM
[de-identified] : General: Well-nourished, well-developed, alert, and in no acute distress.\par Head: Normocephalic.\par Eyes: Pupils equal round reactive to light and accommodation, extraocular muscles intact, normal sclera.\par Nose: No nasal discharge.\par Cardiac: Regular rate. Extremities are warm and well perfused. Distal pulses are symmetric bilaterally.\par Respiratory: No labored breathing.\par Extremities: Sensation is intact distally bilaterally.  Distal pulses are symmetric bilaterally\par Lymphatic: No regional lymphadenopathy, no lymphedema\par Neurologic: No focal deficits\par Skin: Normal skin color, texture, and turgor\par Psychiatric: Normal affect\par MSK: as noted above/below\par \par \par \par RIGHT SHOULDER:\par  \par Inspection:no bruising, rash, erythema, deformity\par Joint Effusion:no\par ROM:MILD DECREASED, FF/ABDUCTION ~160, ER ~70, IR TO HIP\par Palpation: PAIN AT SUBACROMIAL AREA, PAIN AT GH JOINT, NO AC joint pain, Bicipital Groove Pain , Clavicle pain , GT pain \par Distal Pulses:normal\par Upper Extremity Reflexes:2+\par Shoulder Strength: 5/5 \par Upper Extremity Sensation: normal\par \par Special Tests:\par Empty Can: NEGATIVE\par Cross Arm: POSITIVE\par Neer: POSITIVE\par Griffin: POSITIVE\par Speed: Negative\par \par \par LEFT SHOULDER:\par  \par Inspection:no bruising, rash, erythema, deformity\par Joint Effusion:no\par ROM:normal Forward Flexion, Abduction , Internal Rotation: , External Rotation \par Palpation: NO AC joint pain, Bicipital Groove Pain , GH joint pain , Clavicle pain , GT pain \par Distal Pulses:normal\par Upper Extremity Reflexes:2+\par Shoulder Strength: 5/5 \par Upper Extremity Sensation: normal\par \par Special Tests:\par Empty Can: Negative \par Lift-Off Test: Negative \par Cross Arm: Negative\par Neer: Negative\par Griffin: Negative\par Speed: Negative\par \par

## 2022-02-07 NOTE — HISTORY OF PRESENT ILLNESS
[de-identified] : The patient is a 40 year old, rhd woman presenting for follow-up for right shoulder pain\par \par She is currently unemployed.\par \par She was last seen about 6 months ago for chronic, atraumatic right shoulder pain.  She recalls seeing Orthopedics in the past, with diagnosis of tendinitis.  The patient denies distal numbness, weakness, paresthesias.  Pain is worse when sleeping on the affected side.  She tried PT short term, but did not dedicate enough time to PT.  NSAIDs have not improved pain significantly.\par \par At her last visit, she had subacromial bursa CSI, and we discussed starting PT.  Symptoms recurred without precipitating injury.\par \par 
Abdomen soft, nontender, nondistended, bowel sounds present in all 4 quadrants.

## 2022-05-02 ENCOUNTER — APPOINTMENT (OUTPATIENT)
Dept: ORTHOPEDIC SURGERY | Facility: CLINIC | Age: 42
End: 2022-05-02

## 2022-05-31 ENCOUNTER — APPOINTMENT (OUTPATIENT)
Dept: ORTHOPEDIC SURGERY | Facility: CLINIC | Age: 42
End: 2022-05-31

## 2022-06-07 ENCOUNTER — APPOINTMENT (OUTPATIENT)
Dept: ORTHOPEDIC SURGERY | Facility: CLINIC | Age: 42
End: 2022-06-07

## 2023-02-13 ENCOUNTER — APPOINTMENT (OUTPATIENT)
Dept: PHYSICAL MEDICINE AND REHAB | Facility: CLINIC | Age: 43
End: 2023-02-13
Payer: MEDICAID

## 2023-02-13 VITALS
SYSTOLIC BLOOD PRESSURE: 111 MMHG | DIASTOLIC BLOOD PRESSURE: 79 MMHG | HEART RATE: 101 BPM | HEIGHT: 60 IN | BODY MASS INDEX: 27.48 KG/M2 | WEIGHT: 140 LBS | OXYGEN SATURATION: 97 %

## 2023-02-13 PROCEDURE — 73564 X-RAY EXAM KNEE 4 OR MORE: CPT | Mod: RT

## 2023-02-13 PROCEDURE — 99205 OFFICE O/P NEW HI 60 MIN: CPT

## 2023-02-13 NOTE — DATA REVIEWED
[Plain X-Rays] : plain X-Rays [FreeTextEntry1] : Weight bearing x-rays of the RIGHT knees (AP, flexion weight bearing, lateral, and merchant views) demonstrate no significant joint space narrowing except in the patellofemoral compartment, neg fracture or dislocation, +narrowing in the patellofemoral joint with lateral patellar tilt.\par

## 2023-02-13 NOTE — PHYSICAL EXAM
[FreeTextEntry1] : GEN: NAD, well dressed, well nourished\par HEENT: NCAT, oropharynx clear\par CV: S1S2, RRR\par RESP: on room air, no labored breathing\par ABD: non distended\par EXT: well perfused, no calf tenderness\par \par RIGHT KNEE:\par no scars\par trace effusion\par no laceration\par no increased warmth\par no erythema\par no varus deformity\par no valgus deformity\par absent J sign\par \par ROM\par Full range of motion in extension, no flexion contracture\par No added AROM/PROM with knee extension\par Nearly full range of motion in flexion, 0-120\par \par Palpation\par + crepitus\par neg TTP over the quadriceps tendon\par neg TTP over the base of the patella\par ++TTP over the apex of the patella\par neg TTP over medial border of the patella\par neg TTP over lateral border of the patella\par +TTP over the medial joint line\par +TTP over the lateral joint line\par neg TTP over pes anserine area in the medial face of the tibia\par neg TTP over tibial tuberosity\par neg TTP over fibular head\par neg TTP over the popliteal fossa\par + TTP over the patellar tendon\par Manual Muscle Testing\par 4/5 in all planes with resisted isometric stress\par \par +laxity with varus stress with the knee extended at 0\par +laxity with varus stress with the knee extended at 30\par neg laxity with valgus stress with the knee extended at 0\par neg laxity with valgus stress with the knee extended at 30\par neg Lachmann Test\par neg Anterior drawer\par neg Posterior drawer\par neg Campa’s Sign\par neg Patellar apprehension\par neg Apley’s Rotation-Distraction Test (increased rotation, ligamentous) vs contralateral limb\par neg Apley’s Rotation-Compression Test (decreased rotation, meniscal) vs contralateral limb\par neg Arsenio Test with Tibia Externally Rotated (MM)\par neg Arsenio Test with Tibia Internally Rotated (LM)\par \par Sensation intact to light touch over all aspects of the right lower leg\par Distal pulses intact\par \par LEFT KNEE:\par no scars\par no effusion\par no laceration\par no increased warmth\par no erythema\par no varus deformity\par no valgus deformity\par absent  J sign\par \par ROM\par Full range of motion in extension, no flexion contracture\par No added AROM/PROM with knee extension\par Full range of motion in flexion, 0-135\par \par Palpation\par + crepitus\par neg TTP over the quadriceps tendon\par neg TTP over the base of the patella\par neg TTP over the apex of the patella\par neg TTP over medial border of the patella\par neg TTP over lateral border of the patella\par neg TTP over the medial joint line\par neg TTP over the lateral joint line\par neg TTP over pes anserine area in the medial face of the tibia\par neg TTP over tibial tuberosity\par neg TTP over fibular head\par neg TTP over the popliteal fossa\par \par Manual Muscle Testing\par 5/5 in all planes with resisted isometric stress\par \par neg laxity with varus stress with the knee extended at 0\par neg laxity with varus stress with the knee extended at 30\par neg laxity with valgus stress with the knee extended at 0\par neg laxity with valgus stress with the knee extended at 30\par neg Lachmann Test\par neg Anterior drawer\par neg Posterior drawer\par neg Campa’s Sign\par neg Patellar apprehension\par neg Apley’s Rotation-Distraction Test (increased rotation, ligamentous) vs contralateral limb\par neg Apley’s Rotation-Compression Test (decreased rotation, meniscal) vs contralateral limb\par neg Arsenio Test with Tibia Externally Rotated (MM)\par neg Arsenio Test with Tibia Internally Rotated (LM)\par neg Thessaly Test\par \par Sensation intact to light touch over all aspects of the right lower leg\par Distal pulses intact\par \par + Antalgic gait\par \par

## 2023-02-13 NOTE — HISTORY OF PRESENT ILLNESS
[FreeTextEntry1] : Salty Swift M.D.\par Sports Medicine and Interventional Spine\par Department of Physical Medicine and Rehabilitation \par [facility] \par Email: [email or web address] <[email or web address]>\par \par [facility]\par [facility] [place] [facility]\par [address]\par [address], [address] Floor\[place] [address]\par \par [facility]\par [facility] [place] at [facility]\par [address], 4th Floor\[place] [address]\par \par [facility] at \par [facility]\par [address], 6th Floor\[place] [address]\par \par [facility] at [facility]\par [address] [facility]\[place] [address]\par \par [facility] [place] \par [place] [facility] at [facility]\par [address] [facility], [address]\[place] [address]\par \par For [place] Appointments\par Phone: [phone]\par Fax: [phone]\par \par For [place] Appointments\par Phone: [phone]\par Fax: [phone]\par \par \par ----------------------------------------------------------------------------------------------------------------------------------------\par \par PATIENT: DEEPTI RDZ \par MRN: [id number] \par YOB: 1980 \par DATE OF VISIT: 02/13/2023 \par Referred by ZURI FERMIN\par PCP ADDRESS:\par \par Feb 13, 2023 \par \par \par Dear  \par \par Thank you for referring DEEPTI RDZ to my Sports and Interventional Spine practice and office. Enclosed is a copy of the patient's consultation/progress note, which includes my complete assessment and recent studies completed during the patient's evaluation.\par \par If you have questions or have any patients who require nonsurgical, non-opiate management of any sports, spine, or musculoskeletal conditions, please do not hesitate to contact my , Ashley Liu at [phone].\par \par I look forward to taking care of your patients along with you.\par \par Sincerely,\par \par Salty\par \par Salty Swift MD\par Sports, Interventional Spine, & Regenerative Musculoskeletal Medicine\par [facility] [place] [facility]\par Email: [email or web address]\par \par \par                                                   Initial Consultation:\par CC: right knee pain \par \par HPI:  This is the first visit to [facility]'s [facility] [place] [facility] Sports Medicine and Interventional Spine Practice.  \par \par DEEPTI RDZ presents with the chief complaint as above.  \par \par Initial Hx on 02/13/2023 :\par Presents in person to [place]\par patient reports no injury or inciting event for the right knee\par patient reports worsening pain over the past several months, now has difficulty with their job due to the knee pain\par patient has been to their PCP, using recommended ointment to the right knee\par pain occurs over the anterior right knee over the medial joint line\par The patient’s difficulties began 6 months ago  \par The pain is graded as moderate to severe\par The pain is described as sharp\par The pain is intermittent\par The pain does not radiate\par The patient feels that the pain is overall persistent\par Patient denies other recent fall, MVA, injury, trauma, or accident besides presenting history above\par \par Patient suffers from diabetes, usually 200-250 in the morning\par \par Aggravating: ambulation, prolonged sitting, prolonged standing, navigating stairs, sit to stand transitional movements, getting out of bed\par Alleviating: rest, activity modification, avoiding prolonged walking, pharmacologic treatments\par \par Meds: denies regular PO pain medications\par Therapy Program: no recent structured targeted therapy program\par HEP: doing HEP regularly\par \par Assoc Sx:\par Denies numbness, Tingling\par Denies Focal motor weakness in the upper or lower limbs\par Denies New or worsened bowel or bladder incontinence\par Denies Saddle anesthesia\par Denies Buckling\par Denies Using Orthotic(s)/Supportive devices\par Denies Swelling in the upper/lower extremities\par Denies Clicking\par They also deny frequent tripping, falling\par \par ROS: A 14 point review of systems was completed. Positive findings are pain as described above. The remaining systems negative.\par \par COVID HX: reviewed\par \par Assoc Hx:\par Ambulates without assistive device\par Injection Hx: denies locally directed treatment to the area in question\par Imaging Hx: reviewed\par \par Level of functioning: indep with ambulation, indep with ADLs\par Living Situation: dwelling with steps to enter\par \par

## 2023-02-13 NOTE — ASSESSMENT
[FreeTextEntry1] :                                                       Assessment/Plan:\par \par DEEPTI RDZ is a 42 year female with acute on chronic right knee pain here for initial consultation.\par \par Patellofemoral arthritis, right\par Patellofemoral pain syndrome, right\par Decreased range of motion of knee, right\par Weakness of the hip, right\par \par - Tiers of treatment and management of above diagnosis(es) were discussed with patient\par - Optimal diet, weight, sleep, and lifestyle management to minimize stress and maximize well being counseling provided\par - Imaging reviewed and discussed with patient\par - Reviewed previous encounter notes from 2/7/2022 Dr. JEFFREY Farris ([employer] [facility])\par - Patient was advised to start a structured, targeted therapy program 2-3x/wk for 8 wks with goal toward HEP\par - Patient was educated on an appropriate home exercise program, provided with exercise recommendations, all questions answered\par - Patient was advised to start Meloxicam 15 mg daily with food/milk for 5-7 days to help with pain and to decrease inflammation, afterwards as needed\par - Patient may trial topical diclofenac 1% gel QID to the site of their worst pain for the next 7 to 10 days to help with pain and relieve inflammation. Afterwards, they were advised to use only as needed.\par \par - Patient was advised to apply cool compresses or warm heat to affected regions PRN\par - Radiographs of knee ordered and reviewed with patient today\par \par - Educated about red flag symptoms including (but not limited to) new, worsened, or persistent: fever greater than 100F, bowel or bladder incontinence, bowel obstipation, inability to void urine, urinary leakage, Severe nausea or vomiting, Worsening numbness, worsening tingling/paresthesias, and/or new or progressive motor weakness; advised to seek immediate medical attention at his nearest Emergency department should they experience any of the above\par \par - Follow up in 2-3 weeks in person\par \par I have personally spent a total of at least 65 minutes preparing, reviewing internal and external records, explaining, counseling, and coordinating care for this patient encounter.\par \par Thank you, ZURI FERMIN, for allowing me to participate in the care of your patient. Please do not hesitate to contact me with questions/concerns.\par \par Salty Swift M.D.\par Sports and Interventional Spine\par Department of Physical Medicine and Rehabilitation \par [facility] \par Email: [email or web address]\par \par [facility]\par Orthopaedic [facility]\par [address]\par [facility], 11th Floor\[place] [address]\par \par Appointments: [phone]\par Fax: [phone]\par

## 2023-03-07 ENCOUNTER — RESULT REVIEW (OUTPATIENT)
Age: 43
End: 2023-03-07

## 2023-03-07 ENCOUNTER — APPOINTMENT (OUTPATIENT)
Dept: PHYSICAL MEDICINE AND REHAB | Facility: CLINIC | Age: 43
End: 2023-03-07
Payer: MEDICAID

## 2023-03-07 ENCOUNTER — OUTPATIENT (OUTPATIENT)
Dept: OUTPATIENT SERVICES | Facility: HOSPITAL | Age: 43
LOS: 1 days | End: 2023-03-07
Payer: COMMERCIAL

## 2023-03-07 VITALS
HEART RATE: 100 BPM | HEIGHT: 60 IN | BODY MASS INDEX: 30.43 KG/M2 | OXYGEN SATURATION: 96 % | DIASTOLIC BLOOD PRESSURE: 75 MMHG | WEIGHT: 155 LBS | SYSTOLIC BLOOD PRESSURE: 114 MMHG

## 2023-03-07 PROCEDURE — 73030 X-RAY EXAM OF SHOULDER: CPT

## 2023-03-07 PROCEDURE — 73030 X-RAY EXAM OF SHOULDER: CPT | Mod: 26,RT

## 2023-03-07 PROCEDURE — 99215 OFFICE O/P EST HI 40 MIN: CPT | Mod: 25

## 2023-03-07 PROCEDURE — 20610 DRAIN/INJ JOINT/BURSA W/O US: CPT | Mod: RT

## 2023-03-16 NOTE — PROCEDURE
[de-identified] : PROCEDURE: Corticosteroid injection of knee, right\par \par Patient: DEEPTI RDZ \par MRN #: [id number]\par : 1980          					\par Date: 2023  \par \par Physician: Salty Swift MD\par \par Joint	Knee\par Side	RIGHT\par Substance	Corticosteroid\par \par Procedure note: The patient was positioned sitting at the edge of the examination table.  After risks, benefits and alternatives were discussed, the patient signed informed consent. Infero-lateral border of the patella was identified by palpation and indented with a needle cap without trauma to the skin. The inferolateral knee region was then prepped with chloraprep. Topical anesthesia spray was then applied to the inferolateral knee. A 25G 1.5" needle was then inserted into the joint. After negative aspiration, mixture of 2 mLof 1% lidocaine and 1mL of kenalog was then injected into the joint's inferolateral approach. Excellent flow of fluid was noted in the joint space.  There was no bleeding or complications.  Performed on the right knee then on the left. The patient tolerated the procedure well.\par \par Lidocaine 1%\par Lot :[id number]\par Exp Date: 10/2025\par \par triamcinolone \par 40mg/mL\par Lot [id number]\par Exp Date: 2024\par

## 2023-03-16 NOTE — ASSESSMENT
[FreeTextEntry1] :                                                       Assessment/Plan:\par \par DEEPTI RDZ is a 42 year female with acute on chronic right knee pain here for follow up visit\par \par Patellofemoral arthritis, right\par Patellofemoral pain syndrome, right\par Decreased range of motion of knee, right\par Weakness of the hip, right\par \par - Tolerated CSI to the right knee in office today\par - Tiers of treatment and management of above diagnosis(es) were discussed with patient\par - Optimal diet, weight, sleep, and lifestyle management to minimize stress and maximize well being counseling provided\par - Imaging reviewed and discussed with patient\par - Reviewed previous encounter notes from 2/7/2022 Dr. JEFFREY Farris ([facility])\par - Patient was advised to start a structured, targeted therapy program 2-3x/wk for 8 wks with goal toward HEP\par - Patient was educated on an appropriate home exercise program, provided with exercise recommendations, all questions answered\par - Patient was advised to use Meloxicam 15 mg daily with food/milk AS needed only for pain, refil sent\par - Patient may continue to trial topical diclofenac 1% gel QID to the site of their worst pain for the next 7 to 10 days to help with pain and relieve inflammation. Afterwards, they were advised to use only as needed.\par - Patient was advised to apply cool compresses or warm heat to affected regions PRN\par \par - Educated about red flag symptoms including (but not limited to) new, worsened, or persistent: fever greater than 100F, bowel or bladder incontinence, bowel obstipation, inability to void urine, urinary leakage, Severe nausea or vomiting, Worsening numbness, worsening tingling/paresthesias, and/or new or progressive motor weakness; advised to seek immediate medical attention at his nearest Emergency department should they experience any of the above\par \par - Follow up in 2 months in person after completing therapy program\par \par I have personally spent a total of at least 50 minutes preparing, reviewing internal and external records, explaining, counseling, and coordinating care for this patient encounter.\par \par Thank you, ZURI FERMIN, for allowing me to participate in the care of your patient. Please do not hesitate to contact me with questions/concerns.\par \par Salty Swift M.D.\par Sports and Interventional Spine\par Department of Physical Medicine and Rehabilitation \par [facility] \par Email: [email or web address]\par \par [facility]\par Orthopaedic [facility]\par [address]\par [facility], 11th Floor\[place] [address]\par \par Appointments: [phone]\par Fax: [phone]\par

## 2023-03-16 NOTE — HISTORY OF PRESENT ILLNESS
[FreeTextEntry1] : Salty Swift M.D.\par Sports Medicine and Interventional Spine\par Department of Physical Medicine and Rehabilitation \par [facility] \par Email: [email or web address] <[email or web address]>\par \par [facility]\par Orthopaedic [place] [facility]\par [address]\par [address], [address]th Floor\[place] [address]\par \par [facility]\par [facility] [place] at [facility]\par [address], 4th Floor\[place] [address]\par \par [facility] at \par [facility]\par [address], 6th Floor\[place] [address]\par \par [facility] at [facility]\par [address] [facility]\[place] [address]\par \par [facility]ician [facility] [place] \par [facility] [facility] at [facility]\par [address] [facility], [address]\[place] [address]\par \par For [place] Appointments\par Phone: [phone]\par Fax: [phone]\par \par For [place] Appointments\par Phone: [phone]\par Fax: [phone]\par \par \par ----------------------------------------------------------------------------------------------------------------------------------------\par \par PATIENT: DEEPTI RDZ \par MRN: [id number] \par YOB: 1980 \par DATE OF VISIT: 03/07/2023\par Referred by ZURI FERMIN\par PCP ADDRESS:\par \par Mar 07, 2023 \par \par \par Dear  \par \par Thank you for referring DEEPTI RDZ to my Sports and Interventional Spine practice and office. Enclosed is a copy of the patient's consultation/progress note, which includes my complete assessment and recent studies completed during the patient's evaluation.\par \par If you have questions or have any patients who require nonsurgical, non-opiate management of any sports, spine, or musculoskeletal conditions, please do not hesitate to contact my , Ashley Liu at [phone].\par \par I look forward to taking care of your patients along with you.\par \par Sincerely,\par \par Salty\par \par Salty Swift MD\par Sports, Interventional Spine, & Regenerative Musculoskeletal Medicine\par [facility] [place] at [facility]\par Email: [email or web address]\par \par \par                                                   Follow Up Visit\par CC: right knee pain \par \par HPI:  This is a follow up visit to [facility] [place] [facility] Sports Medicine and Interventional Spine Practice.  \par \par DEEPTI RDZ presents with the chief complaint as above.  \par \par Interval Hx on Mar 07, 2023: presents for follow up.  [id number] Oli. Since last visit, they continue to have right > left knee pain, worse with standing, worse with stair climbing, patient has relief with taking breaks. Patient started PT since their last visit, patient has had >4 sessions at [facility] in [place], [place]; also having relief with medication and PT treatments.  There have been no significant changes to their aggravating or alleviating factors since the last visit. Pharmacologic treatments now include OTC analgesics PRN, but otherwise pharmacologic treatments are minimal. Denies new or worsened numbness, tingling, or focal motor deficit. Denies interval fall, accident, or injury. Patient relates that her AM blood glucose has been better controlled 130-135 most of the time recently in the AM, taking insulin daily but does not check her levels.  Patient reports increasing frequency of severe pain as well as increasingly severe intensity of pain. Patient reports having difficulty with ambulation indoors due to pain and stiffness from the right knee. Patient reports difficulty with everyday activities including reaching down/bending forward for lower body dressing, toileting, hygiene, and transferring about surfaces within her home for sitting, resting.\par \par Meds: denies regular PO pain medications\par Therapy Program: no recent structured targeted therapy program\par HEP: doing HEP regularly\par \par Assoc Sx:\par Denies numbness, Tingling\par Denies Focal motor weakness in the upper or lower limbs\par Denies New or worsened bowel or bladder incontinence\par Denies Saddle anesthesia\par Denies Buckling\par Denies Using Orthotic(s)/Supportive devices\par Denies Swelling in the upper/lower extremities\par Denies Clicking\par They also deny frequent tripping, falling\par \par ROS: A 14 point review of systems was completed. Positive findings are pain as described above. The remaining systems negative.\par \par COVID HX: reviewed\par \par Initial Hx on 02/13/2023: Presents in person to [facility]. patient reports no injury or inciting event for the right knee. patient reports worsening pain over the past several months, now has difficulty with their job due to the knee pain. patient has been to their PCP, using recommended ointment to the right knee. pain occurs over the anterior right knee over the medial joint line. The patient’s difficulties began 6 months ago. The pain is graded as moderate to severe. The pain is described as sharp. The pain is intermittent. The pain does not radiate. The patient feels that the pain is overall persistent. Patient denies other recent fall, MVA, injury, trauma, or accident besides presenting history above. Patient suffers from diabetes, usually 200-250 in the morning. Aggravating: ambulation, prolonged sitting, prolonged standing, navigating stairs, sit to stand transitional movements, getting out of bed\par Alleviating: rest, activity modification, avoiding prolonged walking, pharmacologic treatments\par \par Assoc Hx:\par Ambulates without assistive device\par Injection Hx: denies locally directed treatment to the area in question\par Imaging Hx: reviewed\par \par Level of functioning: indep with ambulation, indep with ADLs\par Living Situation: dwelling with steps to enter\par \par

## 2023-03-17 ENCOUNTER — APPOINTMENT (OUTPATIENT)
Dept: PHYSICAL MEDICINE AND REHAB | Facility: CLINIC | Age: 43
End: 2023-03-17

## 2023-03-17 ENCOUNTER — APPOINTMENT (OUTPATIENT)
Dept: PHYSICAL MEDICINE AND REHAB | Facility: CLINIC | Age: 43
End: 2023-03-17
Payer: MEDICAID

## 2023-03-17 VITALS
HEART RATE: 90 BPM | SYSTOLIC BLOOD PRESSURE: 104 MMHG | BODY MASS INDEX: 30.43 KG/M2 | DIASTOLIC BLOOD PRESSURE: 71 MMHG | OXYGEN SATURATION: 96 % | HEIGHT: 60 IN | WEIGHT: 155 LBS

## 2023-03-17 DIAGNOSIS — M25.461 EFFUSION, RIGHT KNEE: ICD-10-CM

## 2023-03-17 DIAGNOSIS — M22.2X1 PATELLOFEMORAL DISORDERS, RIGHT KNEE: ICD-10-CM

## 2023-03-17 PROCEDURE — 99215 OFFICE O/P EST HI 40 MIN: CPT

## 2023-03-21 PROBLEM — M25.461 EFFUSION OF RIGHT KNEE: Status: ACTIVE | Noted: 2023-03-17

## 2023-03-21 PROBLEM — M22.2X1 PATELLOFEMORAL PAIN SYNDROME OF RIGHT KNEE: Status: ACTIVE | Noted: 2023-02-13

## 2023-03-21 NOTE — DATA REVIEWED
[FreeTextEntry1] : Weight bearing x-rays of the RIGHT knees (AP, flexion weight bearing, lateral, and merchant views) demonstrate no significant joint space narrowing except in the patellofemoral compartment, neg fracture or dislocation, +narrowing in the patellofemoral joint with lateral patellar tilt.\par

## 2023-03-21 NOTE — PHYSICAL EXAM
[FreeTextEntry1] : GEN: NAD, well dressed, well nourished\par HEENT: NCAT, oropharynx clear\par CV: S1S2, RRR\par RESP: on room air, no labored breathing\par ABD: non distended\par EXT: well perfused, no calf tenderness\par \par RIGHT KNEE:\par no scars\par trace effusion\par no laceration\par no increased warmth\par no erythema\par no varus deformity\par no valgus deformity\par absent J sign\par \par ROM\par Full range of motion in extension, no flexion contracture\par No added AROM/PROM with knee extension\par Nearly full range of motion in flexion, 0-120\par \par Palpation\par + crepitus\par neg TTP over the quadriceps tendon\par neg TTP over the base of the patella\par ++TTP over the apex of the patella\par neg TTP over medial border of the patella\par neg TTP over lateral border of the patella\par +TTP over the medial joint line\par +TTP over the lateral joint line\par neg TTP over pes anserine area in the medial face of the tibia\par neg TTP over tibial tuberosity\par neg TTP over fibular head\par neg TTP over the popliteal fossa\par + TTP over the patellar tendon\par Manual Muscle Testing\par 4/5 in all planes with resisted isometric stress\par \par +laxity with varus stress with the knee extended at 0\par +laxity with varus stress with the knee extended at 30\par neg laxity with valgus stress with the knee extended at 0\par neg laxity with valgus stress with the knee extended at 30\par neg Lachmann Test\par neg Anterior drawer\par neg Posterior drawer\par neg Campa’s Sign\par neg Patellar apprehension\par neg Apley’s Rotation-Distraction Test (increased rotation, ligamentous) vs contralateral limb\par neg Apley’s Rotation-Compression Test (decreased rotation, meniscal) vs contralateral limb\par neg Arsenio Test with Tibia Externally Rotated (MM)\par neg Arsenio Test with Tibia Internally Rotated (LM)\par \par Sensation intact to light touch over all aspects of the right lower leg\par Distal pulses intact\par \par LEFT KNEE:\par no scars\par no effusion\par no laceration\par no increased warmth\par no erythema\par no varus deformity\par no valgus deformity\par absent  J sign\par \par ROM\par Full range of motion in extension, no flexion contracture\par No added AROM/PROM with knee extension\par Full range of motion in flexion, 0-135\par \par Palpation\par + crepitus\par neg TTP over the quadriceps tendon\par neg TTP over the base of the patella\par neg TTP over the apex of the patella\par neg TTP over medial border of the patella\par neg TTP over lateral border of the patella\par neg TTP over the medial joint line\par neg TTP over the lateral joint line\par neg TTP over pes anserine area in the medial face of the tibia\par neg TTP over tibial tuberosity\par neg TTP over fibular head\par neg TTP over the popliteal fossa\par \par Manual Muscle Testing\par 5/5 in all planes with resisted isometric stress\par \par neg laxity with varus stress with the knee extended at 0\par neg laxity with varus stress with the knee extended at 30\par neg laxity with valgus stress with the knee extended at 0\par neg laxity with valgus stress with the knee extended at 30\par neg Lachmann Test\par neg Anterior drawer\par neg Posterior drawer\par neg Campa’s Sign\par neg Patellar apprehension\par neg Apley’s Rotation-Distraction Test (increased rotation, ligamentous) vs contralateral limb\par neg Apley’s Rotation-Compression Test (decreased rotation, meniscal) vs contralateral limb\par neg Arsenio Test with Tibia Externally Rotated (MM)\par neg Arsenio Test with Tibia Internally Rotated (LM)\par neg Thessaly Test\par \par Sensation intact to light touch over all aspects of the right lower leg\par Distal pulses intact\par \par + Antalgic gait\par \par \par LEFT SHOULDER:\par neg effusion\par neg scars or lacerations or lesions\par neg increased warmth\par neg scapular winging\par neg muscle atrophy\par \par Palpation:\par no TTP over sterna-clavicular joint\par no TTP over clavicle\par no TTP over coracoid process\par no TTP over sternum\par no TTP over AC joint\par no TTP over humerus\par no TTP over the spine of the scapula\par no TTP over the medial border of the scapula, superior angle, inferior angle, lateral border\par no TTP over supraspinatus\par no TTP over infraspinatus\par no TTP over upper trapezius\par no TTP over deltoid muscle\par no TTP in the axilla\par neg crepitus with PROM shoulder\par \par AROM:\par active range of motion full and painless\par scapulohumeral rhythm was smooth, appropriate \par PROM consistent with above\par \par neg sulcus sign\par neg Neer test\par neg Griffin test\par neg Allison’s test\par neg Speed’s test\par neg Yergason’s test\par neg drop arm test\par neg empty can test\par neg Cabot's Test\par neg external rotation lag sign\par neg lift off sign\par neg hornblower’s sign\par neg Horizontal adduction test\par \par Sensation to light touch intact over all dermatomes about the shoulder\par Distal pulses present\par \par RIGHT SHOULDER:\par neg effusion\par neg scars or lacerations or lesions\par neg increased warmth\par neg scapular winging\par neg muscle atrophy\par \par Palpation:\par no TTP over sterna-clavicular joint\par no TTP over clavicle\par no TTP over coracoid process\par no TTP over sternum\par no TTP over AC joint\par +TTP over humerus\par no TTP over the spine of the scapula\par no TTP over the medial border of the scapula, superior angle, inferior angle, lateral border\par +TTP over supraspinatus\par no TTP over infraspinatus\par + TTP over upper trapezius\par +TTP over deltoid muscle\par no TTP in the axilla\par neg crepitus with PROM shoulder\par \par AROM:\par active range of motion limited in most planes as below\par scapulohumeral rhythm was not smooth, appropriate \par \par Abduction 130/170-180\par Forward Flexion 130/160-180\par Elevation through the plane of the scapula 130/170-180\par External Rotation 45/80-90\par Internal Rotation 45/\par Extension 25/50-60\par Adduction 25/50-75\par \par PROM consistent with above\par \par neg sulcus sign\par +Neer test\par +Griffin test\par +Allison’s test\par neg Speed’s test\par neg Yergason’s test\par neg drop arm test\par +empty can test\par +Cabot's Test\par neg external rotation lag sign\par neg lift off sign\par neg hornblower’s sign\par neg Horizontal adduction test\par \par Sensation to light touch intact over all dermatomes about the shoulder\par Distal pulses present\par \par \par Manual Muscle Testing\par \par \par 	C5 (Shoulder Abduction)        C5 (Elbow Flex)	        C6 (Wrist Ext)   \par Right	4/5 5/5 5/5	       \par Left	5/5 5/5 5/5	          \par \par 	C7 (Elbow Ext)            C7 (Wrist Flex)             C8 (Long Finger Flex)          T1 (Finger Abduct)           \par Right	5/5 5/5 5/5 5/5	                                                 \par Left	5/5 5/5 5/5 5/5	                               \par \par 	C8 (Thumb Ext)            C8 & T1 (Thumb Opp)            \par Right	5/5 5/5	                                                 \par Left	5/5 5/5                             \par \par Resisted Internal Rotation\par Right 4/5\par Left 4/5\par \par Resisted External Rotation\par Right 4/5\par Left 4/5\par \par \par \par

## 2023-03-21 NOTE — HISTORY OF PRESENT ILLNESS
[FreeTextEntry1] : Salty Swift M.D.\par Sports Medicine and Interventional Spine\par Department of Physical Medicine and Rehabilitation \par [facility] \par Email: [email or web address] <[email or web address]>\par \par [facility]\par [facility] [place] [facility]\par [address], [address]th Floor\[place] [address]\par \par [facility]\par [facility] [place] [facility]\par [address], [address] Floor\[place] [address]\par \par [facility] \par [facility]\par [address], 6th Floor\[place] [address]\par \par [facility]\par [address]\[place] [address]\par \par [facility] [place] \par [facility]\par [address] [facility] [address]\[place] [address]\par \par For [place] Appointments\par Phone: [phone]\par Fax: [phone]\par \par For [place] Appointments\par Phone: [phone]\par Fax: [phone]\par \par \par ----------------------------------------------------------------------------------------------------------------------------------------\par \par PATIENT: DEEPTI RDZ \par MRN: [id number] \par YOB: 1980 \par DATE OF VISIT: 3/17/2023\par Referred by ZURI FERMIN\par PCP ADDRESS:\par \par Mar 17, 2023 \par \par \par Dear  \par \par Thank you for referring DEEPTI RDZ to my Sports and Interventional Spine practice and office. Enclosed is a copy of the patient's consultation/progress note, which includes my complete assessment and recent studies completed during the patient's evaluation.\par \par If you have questions or have any patients who require nonsurgical, non-opiate management of any sports, spine, or musculoskeletal conditions, please do not hesitate to contact my , Ashley Liu at [phone].\par \par I look forward to taking care of your patients along with you.\par \par Sincerely,\par \par Salty\par \par Salty Swift MD\par Sports, Interventional Spine, & Regenerative Musculoskeletal Medicine\par [facility] [place] [facility]\par Email: [email or web address]\par \par \par                                                   Follow Up Visit\par CC: right knee pain \par \par HPI:  This is a follow up visit to [facility] [place] [facility] Spine [facility].  \par \par DEEPTI RDZ presents with the chief complaint as above.  \par \par Interval Hx on Mar 17, 2023: presents for follow up. Since last visit, they underwent radiographs of the right shoulder. Patient reports pain relief for a few days then less but consistent pain in the right knee. Shoulder pain is worse since the last visit. Since the last visit, they relate improvements in pain previously associated with known exacerbating, aggravating factors and situations. Pain in shoulder has been happening for years, recently worsening. patient has difficulty doing their activities related to their job. Pharmacologic treatments now include OTC analgesics PRN, but otherwise pharmacologic treatments are minimal. Denies new or worsened numbness, tingling, or focal motor deficit. Denies interval fall, accident, or injury. Denies change in bowel or bladder functioning.\par \par Meds: denies regular PO pain medications\par Therapy Program: no recent structured targeted therapy program\par HEP: doing HEP regularly\par \par Assoc Sx:\par Denies numbness, Tingling\par Denies Focal motor weakness in the upper or lower limbs\par Denies New or worsened bowel or bladder incontinence\par Denies Saddle anesthesia\par Denies Buckling\par Denies Using Orthotic(s)/Supportive devices\par Denies Swelling in the upper/lower extremities\par Denies Clicking\par They also deny frequent tripping, falling\par \par ROS: A 14 point review of systems was completed. Positive findings are pain as described above. The remaining systems negative.\par \par COVID HX: reviewed\par \par Interval Hx on Mar 07, 2023: presents for follow up.  [id number] Oli. Since last visit, they continue to have right > left knee pain, worse with standing, worse with stair climbing, patient has relief with taking breaks. Patient started PT since their last visit, patient has had >4 sessions at [address] [place] [address] in [place], [place]; also having relief with medication and PT treatments.  There have been no significant changes to their aggravating or alleviating factors since the last visit. Pharmacologic treatments now include OTC analgesics PRN, but otherwise pharmacologic treatments are minimal. Denies new or worsened numbness, tingling, or focal motor deficit. Denies interval fall, accident, or injury. Patient relates that her AM blood glucose has been better controlled 130-135 most of the time recently in the AM, taking insulin daily but does not check her levels.  Patient reports increasing frequency of severe pain as well as increasingly severe intensity of pain. Patient reports having difficulty with ambulation indoors due to pain and stiffness from the right knee. Patient reports difficulty with everyday activities including reaching down/bending forward for lower body dressing, toileting, hygiene, and transferring about surfaces within her home for sitting, resting.\par \par Initial Hx on 02/13/2023: Presents in [facility] to [facility]. patient reports no injury or inciting event for the right knee. patient reports worsening pain over the past several months, now has difficulty with their job due to the knee pain. patient has been to their PCP, using recommended ointment to the right knee. pain occurs over the anterior right knee over the medial joint line. The patient’s difficulties began 6 months ago. The pain is graded as moderate to severe. The pain is described as sharp. The pain is intermittent. The pain does not radiate. The patient feels that the pain is overall persistent. Patient denies other recent fall, MVA, injury, trauma, or accident besides presenting history above. Patient suffers from diabetes, usually 200-250 in the morning. Aggravating: ambulation, prolonged sitting, prolonged standing, navigating stairs, sit to stand transitional movements, getting out of bed\par Alleviating: rest, activity modification, avoiding prolonged walking, pharmacologic treatments\par \par Assoc Hx:\par Ambulates without assistive device\par Injection Hx: denies locally directed treatment to the area in question\par Imaging Hx: reviewed\par \par Level of functioning: indep with ambulation, indep with ADLs\par Living Situation: dwelling with steps to enter

## 2023-03-21 NOTE — ASSESSMENT
[FreeTextEntry1] :                                                       Assessment/Plan:\par \par DEEPTI RDZ is a 42 year female with acute on chronic right knee pain here for follow up visit\par \par Internal derangement of meniscus, right\par Osteoarthritis of knee, right\par Patellofemoral arthritis, right\par Patellofemoral pain syndrome, right\par Decreased range of motion of knee, right\par Weakness of the hip, right\par \par Subacromial bursitis, right\par Impingement syndrome, right\par Decreased range of motion of shoulder\par Scapular dyskinesis, right\par Postural imbalance\par \par - Tolerated CSI to the right knee in office today\par - Tiers of treatment and management of above diagnosis(es) were discussed with patient\par - Optimal diet, weight, sleep, and lifestyle management to minimize stress and maximize well being counseling provided\par - Imaging reviewed and discussed with patient\par - Reviewed previous encounter notes from 2/7/2022 Dr. JEFFREY Farris ([facility])\par - Patient was advised to resume a structured, targeted therapy program 2-3x/wk for 8 wks with goal toward HEP for the shoulder \par - Patient was educated on an appropriate home exercise program, provided with exercise recommendations, all questions answered\par - Patient was advised to use Meloxicam 15 mg daily with food/milk AS needed only for pain, refil sent\par - Patient may trial topical compound cream to the right knee/right shoulder no more than twice per day as needed for next 2-3 weeks, Rx entered via portal, written information provided to the patient in addition to verbal explanation regarding the medication\par - Patient was advised to apply cool compresses or warm heat to affected regions PRN\par \par - Educated about red flag symptoms including (but not limited to) new, worsened, or persistent: fever greater than 100F, bowel or bladder incontinence, bowel obstipation, inability to void urine, urinary leakage, Severe nausea or vomiting, Worsening numbness, worsening tingling/paresthesias, and/or new or progressive motor weakness; advised to seek immediate medical attention at his nearest Emergency department should they experience any of the above\par \par - MRI right knee without contrast is indicated given that the pt has not improved with tylenol, ibuprofen, naproxen, meloxicam, they obtained non-diagnostic radiographs, and physical therapy/home exercise program>6 weeks. Patient's imaging is medically necessary to outline targets for locally (interventional) directed treatments and/or guide surgical management.\par \par - March 17, 2023: Patient rude to staff, left without allowing for the ultrasound of right shoulder, patient presented hours prior to her visit and as a courtesy writer had her roomed and exam started, patient was offered food on multiple occasions by staff\par \par - Follow up after MRI completed\par \par I have personally spent a total of at least 50 minutes preparing, reviewing internal and external records, explaining, counseling, and coordinating care for this patient encounter.\par \par Thank you, ZURI FERMIN, for allowing me to participate in the care of your patient. Please do not hesitate to contact me with questions/concerns.\par \par Salty Swift M.D.\par Sports and Interventional Spine\par Department of Physical Medicine and Rehabilitation \par [facility] \par Email: [email or web address]\par \par [facility]\par [facility] [place] [facility]\par [address]\par [facility], 11th Floor\[place] [address]\par \par Appointments: [phone]\par Fax: [phone]\par

## 2023-05-08 ENCOUNTER — APPOINTMENT (OUTPATIENT)
Dept: PHYSICAL MEDICINE AND REHAB | Facility: CLINIC | Age: 43
End: 2023-05-08
Payer: MEDICAID

## 2023-05-08 VITALS
OXYGEN SATURATION: 97 % | WEIGHT: 155 LBS | BODY MASS INDEX: 29.27 KG/M2 | HEIGHT: 61 IN | DIASTOLIC BLOOD PRESSURE: 72 MMHG | HEART RATE: 96 BPM | SYSTOLIC BLOOD PRESSURE: 104 MMHG

## 2023-05-08 DIAGNOSIS — M75.51 BURSITIS OF RIGHT SHOULDER: ICD-10-CM

## 2023-05-08 DIAGNOSIS — M67.911 UNSPECIFIED DISORDER OF SYNOVIUM AND TENDON, RIGHT SHOULDER: ICD-10-CM

## 2023-05-08 DIAGNOSIS — M23.91 UNSPECIFIED INTERNAL DERANGEMENT OF RIGHT KNEE: ICD-10-CM

## 2023-05-08 PROCEDURE — 99215 OFFICE O/P EST HI 40 MIN: CPT

## 2023-05-08 RX ORDER — HYALURONATE SODIUM 30 MG/2 ML
30 SYRINGE (ML) INTRAARTICULAR
Qty: 3 | Refills: 0 | Status: ACTIVE | COMMUNITY
Start: 2023-05-08 | End: 1900-01-01

## 2023-05-08 NOTE — HISTORY OF PRESENT ILLNESS
[FreeTextEntry1] : Salty Swift M.D.\par Sports Medicine and Interventional Spine\par Department of Physical Medicine and Rehabilitation \par [facility] \par Email: [email or web address]>\par \par [facility]\par [facility]\par [address], [address]th Floor\[place] [address]\par \par [facility]\par [facility]\par [address], 4[address] Floor\[place] [address]\par \par [facility] \par [facility]\par [address], 6th [address]\[place] [address]\par \par [facility] at [facility]\par [address]\[place] [address]\par \par [facility] \par [facility]\par [address] [facility]\[place] [address]\par \par For [place] Appointments\par Phone: [phone]\par Fax: [phone]\par \par For [place] Appointments\par Phone: [phone]\par Fax: [phone]\par \par \par ----------------------------------------------------------------------------------------------------------------------------------------\par \par PATIENT: DEEPTI RDZ \par MRN: [id number] \par YOB: 1980 \par DATE OF VISIT: 5/8/2023\par Referred by ZURI FERMIN\par PCP ADDRESS:\par \par May 08, 2023 \par \par Dear  \par \par Thank you for referring DEEPTI RDZ to my Sports and Interventional Spine practice and office. Enclosed is a copy of the patient's consultation/progress note, which includes my complete assessment and recent studies completed during the patient's evaluation.\par \par If you have questions or have any patients who require nonsurgical, non-opiate management of any sports, spine, or musculoskeletal conditions, please do not hesitate to contact my , Ashley Liu at [phone].\par \par I look forward to taking care of your patients along with you.\par \par Sincerely,\par \par Salty\par \par Salty Swift MD\par Sports, Interventional Spine, & Regenerative Musculoskeletal Medicine\par [facility]\par Email: [email or web address] \par \par                                                   Follow Up Visit\par CC: right knee pain \par \par HPI:  This is a follow up visit to [facility] Interventional Spine Practice.  \par \par DEEPTI RDZ presents with the chief complaint as above.  \par \par Interval Hx on May 08, 2023: presents for follow up. Since last visit, patient has had about 5 sessions of PT, [facility], focusing mostly on the legs. patient has been doing HEP regularly. patient has been interested in developing further HEP. Since the last visit, they relate improvements in pain previously associated with known exacerbating, aggravating factors and situations. Pharmacologic treatments now include OTC analgesics PRN, but otherwise pharmacologic treatments are minimal. Denies new or worsened numbness, tingling, or focal motor deficit. Denies interval fall, accident, or injury. Denies change in bowel or bladder functioning. 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 Patient believes their right shoulder pain is worsening. Patient has felt improved relief with injection treatment in the past but has not tried gel injections\par \par Meds: denies regular PO pain medications\par Therapy Program: no recent structured targeted therapy program\par HEP: doing HEP regularly\par \par Assoc Sx:\par Denies numbness, Tingling\par Denies Focal motor weakness in the upper or lower limbs\par Denies New or worsened bowel or bladder incontinence\par Denies Saddle anesthesia\par Denies Buckling\par Denies Using Orthotic(s)/Supportive devices\par Denies Swelling in the upper/lower extremities\par Denies Clicking\par They also deny frequent tripping, falling\par \par ROS: A 14 point review of systems was completed. Positive findings are pain as described above. The remaining systems negative.\par \par COVID HX: reviewed\par \par Interval Hx on Mar 17, 2023: presents for follow up. Since last visit, they underwent radiographs of the right shoulder. Patient reports pain relief for a few days then less but consistent pain in the right knee. Shoulder pain is worse since the last visit. Since the last visit, they relate improvements in pain previously associated with known exacerbating, aggravating factors and situations. Pain in shoulder has been happening for years, recently worsening. patient has difficulty doing their activities related to their job. Pharmacologic treatments now include OTC analgesics PRN, but otherwise pharmacologic treatments are minimal. Denies new or worsened numbness, tingling, or focal motor deficit. Denies interval fall, accident, or injury. Denies change in bowel or bladder functioning.\par \par Interval Hx on Mar 07, 2023: presents for follow up.  [id number] Oli. Since last visit, they continue to have right > left knee pain, worse with standing, worse with stair climbing, patient has relief with taking breaks. Patient started PT since their last visit, patient has had >4 sessions at [facility] in [place], [place]; also having relief with medication and PT treatments.  There have been no significant changes to their aggravating or alleviating factors since the last visit. Pharmacologic treatments now include OTC analgesics PRN, but otherwise pharmacologic treatments are minimal. Denies new or worsened numbness, tingling, or focal motor deficit. Denies interval fall, accident, or injury. Patient relates that her AM blood glucose has been better controlled 130-135 most of the time recently in the AM, taking insulin daily but does not check her levels.  Patient reports increasing frequency of severe pain as well as increasingly severe intensity of pain. Patient reports having difficulty with ambulation indoors due to pain and stiffness from the right knee. Patient reports difficulty with everyday activities including reaching down/bending forward for lower body dressing, toileting, hygiene, and transferring about surfaces within her home for sitting, resting.\par \par Initial Hx on 02/13/2023: Presents in person to [facility]. patient reports no injury or inciting event for the right knee. patient reports worsening pain over the past several months, now has difficulty with their job due to the knee pain. patient has been to their PCP, using recommended ointment to the right knee. pain occurs over the anterior right knee over the medial joint line. The patient’s difficulties began 6 months ago. The pain is graded as moderate to severe. The pain is described as sharp. The pain is intermittent. The pain does not radiate. The patient feels that the pain is overall persistent. Patient denies other recent fall, MVA, injury, trauma, or accident besides presenting history above. Patient suffers from diabetes, usually 200-250 in the morning. Aggravating: ambulation, prolonged sitting, prolonged standing, navigating stairs, sit to stand transitional movements, getting out of bed\par Alleviating: rest, activity modification, avoiding prolonged walking, pharmacologic treatments\par \par Assoc Hx:\par Ambulates without assistive device\par Injection Hx: denies locally directed treatment to the area in question\par Imaging Hx: reviewed\par \par Level of functioning: indep with ambulation, indep with ADLs\par Living Situation: dwelling with steps to enter

## 2023-05-08 NOTE — ASSESSMENT
[FreeTextEntry1] :                                                       Assessment/Plan:\par \par DEEPTI RDZ is a 42 year female with acute on chronic right knee pain here for follow up visit\par \par AC Joint Osteoarthritis, right\par Subacromial bursitis, right\par Impingement syndrome, right\par Decreased range of motion of shoulder\par Scapular dyskinesis, right\par Postural imbalance\par \par Osteoarthritis of knee, right\par Patellofemoral arthritis, right\par Patellofemoral pain syndrome, right\par Internal derangement of meniscus, right\par Decreased range of motion of knee, right\par Weakness of the hip, right\par \par - Tiers of treatment and management of above diagnosis(es) were discussed with patient\par - Optimal diet, weight, sleep, and lifestyle management to minimize stress and maximize well being counseling provided\par - Imaging reviewed and discussed with patient\par - Reviewed previous encounter notes from 2/7/2022 Dr. JEFFREY Farris ([facility])\par - Patient was advised to resume a structured, targeted therapy program 2-3x/wk for 8 wks with goal toward HEP for the KNEE\par - Patient was educated on an appropriate home exercise program, provided with exercise recommendations, all questions answered\par - Regarding follow up on March 17, 2023: Patient rude to staff, left without allowing for the ultrasound of right shoulder, patient presented hours prior to her visit and as a courtesy writer had her roomed and exam started, patient was offered food on multiple occasions by staff\par - Patient was continue discontinuing Meloxicam 15 mg daily with food/milk AS needed only for pain, refill sent\par - Patient may trial topical compound cream to the right knee/right shoulder no more than twice per day as needed for next 2-3 weeks, Rx entered via portal again today 5/8/2023, written information provided to the patient in addition to verbal explanation regarding the medication\par - Patient was advised to apply cool compresses or warm heat to affected regions PRN\par \par - Educated about red flag symptoms including (but not limited to) new, worsened, or persistent: fever greater than 100F, bowel or bladder incontinence, bowel obstipation, inability to void urine, urinary leakage, Severe nausea or vomiting, Worsening numbness, worsening tingling/paresthesias, and/or new or progressive motor weakness; advised to seek immediate medical attention at his nearest Emergency department should they experience any of the above\par \par - Given that the pt has not improved with has not improved with tylenol, ibuprofen, naproxen, NSAIDs, muscle relaxants and physical therapy/home exercise program>6 weeks, ultrasound guided right acromioclavicular joint corticosteroid injection was recommended. The purpose, risks, benefits, alternatives were discussed. Risks, benefits alternatives discussed with patient including bleeding, infection, worsening pain, nerve damage, [headache allergic reaction, paralysis,] and the patient elected to proceed with the procedure.  They verbalized understanding. Will schedule once authorization obtained\par \par - Given that the pt has not improved with has not improved with tylenol, ibuprofen, naproxen, NSAIDs, muscle relaxants and physical therapy/home exercise program>6 weeks, series of three gel viscosupplementation injections to the right knee with ultrasound guidance was recommended. The purpose, risks, benefits, alternatives were discussed. Risks, benefits alternatives discussed with patient including bleeding, infection, worsening pain, nerve damage, and the patient elected to proceed with the procedure.  They verbalized understanding. Will schedule once authorization obtained\par \par - Follow up after authorization of either gel series or right AC joint CSI\par \par I have personally spent a total of at least 50 minutes preparing, reviewing internal and external records, explaining, counseling, and coordinating care for this patient encounter.\par \par Thank you, ZURI FERMIN, for allowing me to participate in the care of your patient. Please do not hesitate to contact me with questions/concerns.\par \par Salty Swift M.D.\par Sports and Interventional Spine\par Department of Physical Medicine and Rehabilitation \par [facility] \par Email: [email or web address] \par [facility]\par [facility] [address]\par [facility], [address]th [address]\[place] [address]\[facility] \par Appointments: [phone]\par Fax: [phone]\[facility]

## 2023-05-08 NOTE — PHYSICAL EXAM
[FreeTextEntry1] : GEN: NAD, well dressed, well nourished\par HEENT: NCAT, oropharynx clear\par CV: S1S2, RRR\par RESP: on room air, no labored breathing\par ABD: non distended\par EXT: well perfused, no calf tenderness\par \par RIGHT KNEE:\par no scars\par trace effusion\par no laceration\par no increased warmth\par no erythema\par no varus deformity\par no valgus deformity\par absent J sign\par \par ROM\par Full range of motion in extension, no flexion contracture\par No added AROM/PROM with knee extension\par Nearly full range of motion in flexion, 0-120\par \par Palpation\par + crepitus\par neg TTP over the quadriceps tendon\par neg TTP over the base of the patella\par ++TTP over the apex of the patella\par neg TTP over medial border of the patella\par neg TTP over lateral border of the patella\par +TTP over the medial joint line\par +TTP over the lateral joint line\par neg TTP over pes anserine area in the medial face of the tibia\par neg TTP over tibial tuberosity\par neg TTP over fibular head\par neg TTP over the popliteal fossa\par + TTP over the patellar tendon\par Manual Muscle Testing\par 4/5 in all planes with resisted isometric stress\par \par +laxity with varus stress with the knee extended at 0\par +laxity with varus stress with the knee extended at 30\par neg laxity with valgus stress with the knee extended at 0\par neg laxity with valgus stress with the knee extended at 30\par neg Lachmann Test\par neg Anterior drawer\par neg Posterior drawer\par neg Campa’s Sign\par neg Patellar apprehension\par neg Apley’s Rotation-Distraction Test (increased rotation, ligamentous) vs contralateral limb\par neg Apley’s Rotation-Compression Test (decreased rotation, meniscal) vs contralateral limb\par neg Arsenio Test with Tibia Externally Rotated (MM)\par neg Arsenio Test with Tibia Internally Rotated (LM)\par \par Sensation intact to light touch over all aspects of the right lower leg\par Distal pulses intact\par \par LEFT KNEE:\par no scars\par no effusion\par no laceration\par no increased warmth\par no erythema\par no varus deformity\par no valgus deformity\par absent  J sign\par \par ROM\par Full range of motion in extension, no flexion contracture\par No added AROM/PROM with knee extension\par Full range of motion in flexion, 0-135\par \par Palpation\par + crepitus\par neg TTP over the quadriceps tendon\par neg TTP over the base of the patella\par neg TTP over the apex of the patella\par neg TTP over medial border of the patella\par neg TTP over lateral border of the patella\par neg TTP over the medial joint line\par neg TTP over the lateral joint line\par neg TTP over pes anserine area in the medial face of the tibia\par neg TTP over tibial tuberosity\par neg TTP over fibular head\par neg TTP over the popliteal fossa\par \par Manual Muscle Testing\par 5/5 in all planes with resisted isometric stress\par \par neg laxity with varus stress with the knee extended at 0\par neg laxity with varus stress with the knee extended at 30\par neg laxity with valgus stress with the knee extended at 0\par neg laxity with valgus stress with the knee extended at 30\par neg Lachmann Test\par neg Anterior drawer\par neg Posterior drawer\par neg Campa’s Sign\par neg Patellar apprehension\par neg Apley’s Rotation-Distraction Test (increased rotation, ligamentous) vs contralateral limb\par neg Apley’s Rotation-Compression Test (decreased rotation, meniscal) vs contralateral limb\par neg Arsenio Test with Tibia Externally Rotated (MM)\par neg Arsenio Test with Tibia Internally Rotated (LM)\par neg Thessaly Test\par \par Sensation intact to light touch over all aspects of the right lower leg\par Distal pulses intact\par \par + Antalgic gait\par \par \par LEFT SHOULDER:\par neg effusion\par neg scars or lacerations or lesions\par neg increased warmth\par neg scapular winging\par neg muscle atrophy\par \par Palpation:\par no TTP over sterna-clavicular joint\par no TTP over clavicle\par no TTP over coracoid process\par no TTP over sternum\par no TTP over AC joint\par no TTP over humerus\par no TTP over the spine of the scapula\par no TTP over the medial border of the scapula, superior angle, inferior angle, lateral border\par no TTP over supraspinatus\par no TTP over infraspinatus\par no TTP over upper trapezius\par no TTP over deltoid muscle\par no TTP in the axilla\par neg crepitus with PROM shoulder\par \par AROM:\par active range of motion full and painless\par scapulohumeral rhythm was smooth, appropriate \par PROM consistent with above\par \par neg sulcus sign\par neg Neer test\par neg Griffin test\par neg Allison’s test\par neg Speed’s test\par neg Yergason’s test\par neg drop arm test\par neg empty can test\par neg Saint Ignace's Test\par neg external rotation lag sign\par neg lift off sign\par neg hornblower’s sign\par neg Horizontal adduction test\par \par Sensation to light touch intact over all dermatomes about the shoulder\par Distal pulses present\par \par RIGHT SHOULDER:\par neg effusion\par neg scars or lacerations or lesions\par neg increased warmth\par neg scapular winging\par neg muscle atrophy\par \par Palpation:\par no TTP over sterna-clavicular joint\par no TTP over clavicle\par no TTP over coracoid process\par no TTP over sternum\par no TTP over AC joint\par +TTP over humerus\par no TTP over the spine of the scapula\par no TTP over the medial border of the scapula, superior angle, inferior angle, lateral border\par +TTP over supraspinatus\par no TTP over infraspinatus\par + TTP over upper trapezius\par +TTP over deltoid muscle\par no TTP in the axilla\par neg crepitus with PROM shoulder\par \par AROM:\par active range of motion limited in most planes as below\par scapulohumeral rhythm was not smooth, appropriate \par \par Abduction 130/170-180\par Forward Flexion 130/160-180\par Elevation through the plane of the scapula 130/170-180\par External Rotation 45/80-90\par Internal Rotation 45/\par Extension 25/50-60\par Adduction 25/50-75\par \par PROM consistent with above\par \par neg sulcus sign\par +Neer test\par +Griffin test\par +Allison’s test\par neg Speed’s test\par neg Yergason’s test\par neg drop arm test\par +empty can test\par +Saint Ignace's Test\par neg external rotation lag sign\par neg lift off sign\par neg hornblower’s sign\par neg Horizontal adduction test\par \par Sensation to light touch intact over all dermatomes about the shoulder\par Distal pulses present\par \par \par Manual Muscle Testing\par \par \par 	C5 (Shoulder Abduction)        C5 (Elbow Flex)	        C6 (Wrist Ext)   \par Right	4/5 5/5 5/5	       \par Left	5/5 5/5 5/5	          \par \par 	C7 (Elbow Ext)            C7 (Wrist Flex)             C8 (Long Finger Flex)          T1 (Finger Abduct)           \par Right	5/5 5/5 5/5 5/5	                                                 \par Left	5/5 5/5 5/5 5/5	                               \par \par 	C8 (Thumb Ext)            C8 & T1 (Thumb Opp)            \par Right	5/5 5/5	                                                 \par Left	5/5 5/5                             \par \par Resisted Internal Rotation\par Right 4/5\par Left 4/5\par \par Resisted External Rotation\par Right 4/5\par Left 4/5\par \par + TTP over the right AC joint\par \par

## 2023-05-15 RX ORDER — MELOXICAM 7.5 MG/1
7.5 TABLET ORAL TWICE DAILY
Qty: 28 | Refills: 0 | Status: ACTIVE | COMMUNITY
Start: 2023-02-13 | End: 1900-01-01

## 2024-03-05 ENCOUNTER — APPOINTMENT (OUTPATIENT)
Dept: PHYSICAL MEDICINE AND REHAB | Facility: CLINIC | Age: 44
End: 2024-03-05
Payer: MEDICAID

## 2024-03-05 VITALS
SYSTOLIC BLOOD PRESSURE: 111 MMHG | WEIGHT: 155 LBS | BODY MASS INDEX: 29.27 KG/M2 | OXYGEN SATURATION: 97 % | HEART RATE: 96 BPM | HEIGHT: 61 IN | TEMPERATURE: 97.88 F | DIASTOLIC BLOOD PRESSURE: 78 MMHG

## 2024-03-05 PROCEDURE — 99215 OFFICE O/P EST HI 40 MIN: CPT

## 2024-03-05 PROCEDURE — 73030 X-RAY EXAM OF SHOULDER: CPT | Mod: LT

## 2024-03-05 PROCEDURE — 73000 X-RAY EXAM OF COLLAR BONE: CPT | Mod: 50

## 2024-03-05 RX ORDER — FAMOTIDINE 20 MG/1
20 TABLET, FILM COATED ORAL
Qty: 30 | Refills: 0 | Status: ACTIVE | COMMUNITY
Start: 2024-03-05 | End: 1900-01-01

## 2024-03-06 NOTE — ASSESSMENT
[FreeTextEntry1] :                                                       Assessment/Plan:  DEEPTI RDZ is a 43 year female with acute on chronic right knee pain here for follow up visit now with LEFT shoulder pain  Subacromial bursitis, left Tendinopathy of rotator cuff, left Decreased range of motion of shoulder, left  AC Joint Osteoarthritis, right > LEFT  Subacromial bursitis, right Impingement syndrome, right Scapular dyskinesis, right Postural imbalance  Osteoarthritis of knee, right Patellofemoral arthritis, right Patellofemoral pain syndrome, right Internal derangement of meniscus, right Decreased range of motion of knee, right Weakness of the hip, right  - Tiers of treatment and management of above diagnosis(es) were discussed with patient - Optimal diet, weight, sleep, and lifestyle management to minimize stress and maximize well being counseling provided - Imaging reviewed and discussed with patient - Reviewed previous encounter notes from 2/7/2022 Dr. JEFFREY Farris ([facility]) - Mar 05, 2024: Patient was advised to resume a structured, targeted therapy program 2-3x/wk for 8 wks with goal toward HEP for the LEFT SHOULDER - Patient was educated on an appropriate home exercise program, provided with exercise recommendations, all questions answered - Regarding follow up on March 17, 2023: Patient rude to staff, left without allowing for the ultrasound of right shoulder, patient presented hours prior to her visit and as a courtesy writer had her roomed and exam started, patient was offered food on multiple occasions by staff - Patient was advised to start Meloxicam 15 mg daily with food/milk for 5-7 days to help with pain and to decrease inflammation, afterwards as needed. Rx sent with famotidine and written instructions - Patient may trial topical compound cream to the right knee/right shoulder no more than twice per day as needed for next 2-3 weeks, Rx entered via portal again today 5/8/2023, written information provided to the patient in addition to verbal explanation regarding the medication - Patient was advised to apply cool compresses or warm heat to affected regions PRN  - Educated about red flag symptoms including (but not limited to) new, worsened, or persistent: fever greater than 100F, bowel or bladder incontinence, bowel obstipation, inability to void urine, urinary leakage, Severe nausea or vomiting, Worsening numbness, worsening tingling/paresthesias, and/or new or progressive motor weakness; advised to seek immediate medical attention at his nearest Emergency department should they experience any of the above  - Mar 05, 2024: Given that the pt has not improved with has not improved with tylenol, ibuprofen, naproxen, NSAIDs, muscle relaxants and physical therapy/home exercise program>6 weeks, ultrasound guided LEFT subacromial bursa corticosteroid injection was recommended. The purpose, risks, benefits, alternatives were discussed. Risks, benefits alternatives discussed with patient including bleeding, infection, worsening pain, nerve damage, and the patient elected to proceed with the procedure.  They verbalized understanding. Will schedule once authorization obtained  - Follow up in 2-3 weeks after trial of PT, anti-inflammatory medications, and home exercises  I have personally spent a total of at least 45 minutes preparing, reviewing internal and external records, explaining, counseling, and coordinating care for this patient encounter.  Thank you, ZURI FERMIN, for allowing me to participate in the care of your patient. Please do not hesitate to contact me with questions/concerns.  Salty Swift M.D. Sports and Interventional Spine Department of Physical Medicine and Rehabilitation  [facility] [place], [place] Physician [facility] [address] [place] [address]  Appointments: [phone] Fax: [phone]

## 2024-03-06 NOTE — HISTORY OF PRESENT ILLNESS
[FreeTextEntry1] : Salty Swift M.D. Sports Medicine and Interventional Spine Department of Physical Medicine and Rehabilitation  [facility] [address] [place] [address]  [facility] [place] [facility] [address], [address] [place] [address]  For [place] Appointments Phone: [phone] Fax: [phone]  ----------------------------------------------------------------------------------------------------------------------------------------  PATIENT: DEEPTI RDZ  MRN: [id number]  YOB: 1980  DATE OF Service: Mar 05, 2024  Referred by ZURI FERMIN PCP ADDRESS:  Mar 05, 2024  Dear    Thank you for referring DEEPTI RDZ to my Sports and Interventional Spine practice and office. Enclosed is a copy of the patient's consultation/progress note, which includes my complete assessment and recent studies completed during the patient's evaluation.  If you have questions or have any patients who require nonsurgical, non-opiate management of any sports, spine, or musculoskeletal conditions, please do not hesitate to contact my , Ashley Liu at [phone].  I look forward to taking care of your patients along with you.  Sincerely,  Salty Swift MD Sports, Interventional Spine, & Regenerative Musculoskeletal Medicine [facility] [place] [facility]                                                     Follow Up Visit CC: right knee pain   HPI:  This is a follow up visit to [facility] [place] [facility] Sports Medicine and Interventional Spine Practice.    DEEPTI RDZ presents with the chief complaint as above.    Interval Hx on Mar 05, 2024: presents for follow up. Since last visit, they developed left shoulder pain including pain with reaching, overhead movements, and at times without exertional related pain. patient cannot cite any inciting even, patient has alleviated her pain with activity modification, missing work, and rare OTC analgesics. pain started three months ago, seems to be partly related to their work activities. Since the last visit, prior to onset of the LEFT shoulder pain, they relate improvements in B/L knee and RIGHT pain previously associated with known exacerbating, aggravating factors and situations. Pharmacologic treatments now include OTC analgesics PRN, but otherwise pharmacologic treatments are minimal. Denies new or worsened numbness, tingling, or focal motor deficit. Denies interval fall, accident, or injury. Denies change in bowel or bladder functioning.   Meds: denies regular PO pain medications Therapy Program: no recent structured targeted therapy program HEP: doing HEP regularly  Assoc Sx: Denies numbness, Tingling Denies Focal motor weakness in the upper or lower limbs Denies New or worsened bowel or bladder incontinence Denies Saddle anesthesia Denies Buckling Denies Using Orthotic(s)/Supportive devices Denies Swelling in the upper/lower extremities Denies Clicking They also deny frequent tripping, falling  ROS: A 14 point review of systems was completed. Positive findings are pain as described above. The remaining systems negative.  COVID HX: reviewed  Interval Hx on May 08, 2023: presents for follow up. Since last visit, patient has had about 5 sessions of PT, [facility], focusing mostly on the legs. patient has been doing HEP regularly. patient has been interested in developing further HEP. Since the last visit, they relate improvements in pain previously associated with known exacerbating, aggravating factors and situations. Pharmacologic treatments now include OTC analgesics PRN, but otherwise pharmacologic treatments are minimal. Denies new or worsened numbness, tingling, or focal motor deficit. Denies interval fall, accident, or injury. Denies change in bowel or bladder functioning. 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 Patient believes their right shoulder pain is worsening. Patient has felt improved relief with injection treatment in the past but has not tried gel injections  Interval Hx on Mar 17, 2023: presents for follow up. Since last visit, they underwent radiographs of the right shoulder. Patient reports pain relief for a few days then less but consistent pain in the right knee. Shoulder pain is worse since the last visit. Since the last visit, they relate improvements in pain previously associated with known exacerbating, aggravating factors and situations. Pain in shoulder has been happening for years, recently worsening. patient has difficulty doing their activities related to their job. Pharmacologic treatments now include OTC analgesics PRN, but otherwise pharmacologic treatments are minimal. Denies new or worsened numbness, tingling, or focal motor deficit. Denies interval fall, accident, or injury. Denies change in bowel or bladder functioning.  Interval Hx on Mar 07, 2023: presents for follow up.  [id number] Oli. Since last visit, they continue to have right > left knee pain, worse with standing, worse with stair climbing, patient has relief with taking breaks. Patient started PT since their last visit, patient has had >4 sessions at [facility] in [place], [place]; also having relief with medication and PT treatments.  There have been no significant changes to their aggravating or alleviating factors since the last visit. Pharmacologic treatments now include OTC analgesics PRN, but otherwise pharmacologic treatments are minimal. Denies new or worsened numbness, tingling, or focal motor deficit. Denies interval fall, accident, or injury. Patient relates that her AM blood glucose has been better controlled 130-135 most of the time recently in the AM, taking insulin daily but does not check her levels.  Patient reports increasing frequency of severe pain as well as increasingly severe intensity of pain. Patient reports having difficulty with ambulation indoors due to pain and stiffness from the right knee. Patient reports difficulty with everyday activities including reaching down/bending forward for lower body dressing, toileting, hygiene, and transferring about surfaces within her home for sitting, resting.  Initial Hx on 02/13/2023: Presents in [facility] to [facility]. patient reports no injury or inciting event for the right knee. patient reports worsening pain over the past several months, now has difficulty with their job due to the knee pain. patient has been to their PCP, using recommended ointment to the right knee. pain occurs over the anterior right knee over the medial joint line. The patient's difficulties began 6 months ago. The pain is graded as moderate to severe. The pain is described as sharp. The pain is intermittent. The pain does not radiate. The patient feels that the pain is overall persistent. Patient denies other recent fall, MVA, injury, trauma, or accident besides presenting history above. Patient suffers from diabetes, usually 200-250 in the morning. Aggravating: ambulation, prolonged sitting, prolonged standing, navigating stairs, sit to stand transitional movements, getting out of bed Alleviating: rest, activity modification, avoiding prolonged walking, pharmacologic treatments  Assoc Hx: Ambulates without assistive device Injection Hx: denies locally directed treatment to the area in question Imaging Hx: reviewed  Level of functioning: indep with ambulation, indep with ADLs Living Situation: dwelling with steps to enter

## 2024-03-06 NOTE — DATA REVIEWED
[Plain X-Rays] : plain X-Rays [FreeTextEntry1] : Mar 05, 2024:  X-rays of the LEFT (AP, Grashey, scapular-Y, and axillary views) demonstrate:  minimal joint space narrowing, minimal AC joint OA, neg fracture or dislocation  Mar 05, 2024  XR of the B/L clavicles obtained including AP and Zanca views on each side: no ACJ separation, left AC joint OA   Weight bearing x-rays of the RIGHT knees (AP, flexion weight bearing, lateral, and merchant views) demonstrate no significant joint space narrowing except in the patellofemoral compartment, neg fracture or dislocation, +narrowing in the patellofemoral joint with lateral patellar tilt.

## 2024-03-06 NOTE — PHYSICAL EXAM
[FreeTextEntry1] : GEN: NAD, well dressed, well nourished HEENT: NCAT, oropharynx clear CV: S1S2, RRR RESP: on room air, no labored breathing ABD: non distended EXT: well perfused, no calf tenderness  RIGHT KNEE: no scars trace effusion no laceration no increased warmth no erythema no varus deformity no valgus deformity absent J sign  ROM Full range of motion in extension, no flexion contracture No added AROM/PROM with knee extension Nearly full range of motion in flexion, 0-120  Palpation + crepitus neg TTP over the quadriceps tendon neg TTP over the base of the patella ++TTP over the apex of the patella neg TTP over medial border of the patella neg TTP over lateral border of the patella +TTP over the medial joint line +TTP over the lateral joint line neg TTP over pes anserine area in the medial face of the tibia neg TTP over tibial tuberosity neg TTP over fibular head neg TTP over the popliteal fossa + TTP over the patellar tendon Manual Muscle Testing 4/5 in all planes with resisted isometric stress  +laxity with varus stress with the knee extended at 0 +laxity with varus stress with the knee extended at 30 neg laxity with valgus stress with the knee extended at 0 neg laxity with valgus stress with the knee extended at 30 neg Lachmann Test neg Anterior drawer neg Posterior drawer neg Campa's Sign neg Patellar apprehension neg Apley's Rotation-Distraction Test (increased rotation, ligamentous) vs contralateral limb neg Apley's Rotation-Compression Test (decreased rotation, meniscal) vs contralateral limb neg Arsenio Test with Tibia Externally Rotated (MM) neg Arsenio Test with Tibia Internally Rotated (LM)  Sensation intact to light touch over all aspects of the right lower leg Distal pulses intact  LEFT KNEE: no scars no effusion no laceration no increased warmth no erythema no varus deformity no valgus deformity absent  J sign  ROM Full range of motion in extension, no flexion contracture No added AROM/PROM with knee extension Full range of motion in flexion, 0-135  Palpation + crepitus neg TTP over the quadriceps tendon neg TTP over the base of the patella neg TTP over the apex of the patella neg TTP over medial border of the patella neg TTP over lateral border of the patella neg TTP over the medial joint line neg TTP over the lateral joint line neg TTP over pes anserine area in the medial face of the tibia neg TTP over tibial tuberosity neg TTP over fibular head neg TTP over the popliteal fossa  Manual Muscle Testing 5/5 in all planes with resisted isometric stress  neg laxity with varus stress with the knee extended at 0 neg laxity with varus stress with the knee extended at 30 neg laxity with valgus stress with the knee extended at 0 neg laxity with valgus stress with the knee extended at 30 neg Lachmann Test neg Anterior drawer neg Posterior drawer neg Campa's Sign neg Patellar apprehension neg Apley's Rotation-Distraction Test (increased rotation, ligamentous) vs contralateral limb neg Apley's Rotation-Compression Test (decreased rotation, meniscal) vs contralateral limb neg Arsenio Test with Tibia Externally Rotated (MM) neg Arsenio Test with Tibia Internally Rotated (LM) neg Thessaly Test  Sensation intact to light touch over all aspects of the right lower leg Distal pulses intact  + Antalgic gait   LEFT SHOULDER: neg effusion neg scars or lacerations or lesions neg increased warmth neg scapular winging neg muscle atrophy  Palpation: no TTP over sterna-clavicular joint no TTP over clavicle no TTP over coracoid process no TTP over sternum Mar 05, 2024 +TTP over AC joint Mar 05, 2024 +TTP over humerus no TTP over the spine of the scapula no TTP over the medial border of the scapula, superior angle, inferior angle, lateral border Mar 05, 2024 +TTP over supraspinatus Mar 05, 2024 +TTP over infraspinatus no TTP over upper trapezius Mar 05, 2024 +TTP over deltoid muscle no TTP in the axilla neg crepitus with PROM shoulder  AROM: active range of motion limited as below  scapulohumeral rhythm was smooth, appropriate   Abduction 125/170-180 Forward Flexion 125/160-180 Elevation through the plane of the scapula 120/170-180 External Rotation 40/80-90 Internal Rotation 60/ Extension 25/50-60 Adduction 45/50-75  PROM consistent with above  Assessment of access (for OVERHEAD/INCLINE PRESS) on the LEFT demonstrates inability to forward flex, externally rotate shoulder beyond 130 degrees without compensatory: + thoracic extension + thoracic rotation of the contralateral side towards the ipsilateral shoulder + postural kyphosis   PROM consistent with above  neg sulcus sign Mar 05, 2024 ++Neer test Mar 05, 2024 +Griffin test Mar 05, 2024 ++Allison's test neg Speed's test neg Yergason's test neg drop arm test Mar 05, 2024 + empty can test neg Ionia's Test neg external rotation lag sign neg lift off sign neg hornblower's sign neg Horizontal adduction test  Sensation to light touch intact over all dermatomes about the shoulder Distal pulses present  RIGHT SHOULDER: neg effusion neg scars or lacerations or lesions neg increased warmth neg scapular winging neg muscle atrophy  Palpation: no TTP over sterna-clavicular joint no TTP over clavicle no TTP over coracoid process no TTP over sternum no TTP over AC joint +TTP over humerus no TTP over the spine of the scapula no TTP over the medial border of the scapula, superior angle, inferior angle, lateral border +TTP over supraspinatus no TTP over infraspinatus + TTP over upper trapezius +TTP over deltoid muscle no TTP in the axilla neg crepitus with PROM shoulder  AROM: active range of motion limited in most planes as below scapulohumeral rhythm was not smooth, appropriate   Abduction 130/170-180 Forward Flexion 130/160-180 Elevation through the plane of the scapula 130/170-180 External Rotation 45/80-90 Internal Rotation 45/ Extension 25/50-60 Adduction 25/50-75  PROM consistent with above  neg sulcus sign +Neer test +Griffin test +Allison's test neg Speed's test neg Yergason's test neg drop arm test +empty can test +Ionia's Test neg external rotation lag sign neg lift off sign neg hornblower's sign neg Horizontal adduction test  Sensation to light touch intact over all dermatomes about the shoulder Distal pulses present   Manual Muscle Testing   	C5 (Shoulder Abduction)        C5 (Elbow Flex)	        C6 (Wrist Ext)    Right	4/5	                                5/5	                          5/5	        Left	4/5	                                4/5	                          5/5	            	C7 (Elbow Ext)            C7 (Wrist Flex)             C8 (Long Finger Flex)          T1 (Finger Abduct)            Right	5/5	                    5/5                                      5/5	                                      5/5	                                                  Left	4/5	                    5/5                                      5/5	                                      5/5	                                 	C8 (Thumb Ext)            C8 & T1 (Thumb Opp)             Right	5/5	                           5/5	                                                  Left	5/5	                           5/5                               Resisted Internal Rotation Right 4/5 Left 4-/5  Resisted External Rotation Right 4/5 Left 4-/5

## 2024-03-26 ENCOUNTER — APPOINTMENT (OUTPATIENT)
Dept: PHYSICAL MEDICINE AND REHAB | Facility: CLINIC | Age: 44
End: 2024-03-26
Payer: MEDICAID

## 2024-03-26 VITALS
WEIGHT: 155 LBS | OXYGEN SATURATION: 98 % | HEART RATE: 102 BPM | SYSTOLIC BLOOD PRESSURE: 114 MMHG | TEMPERATURE: 98.06 F | DIASTOLIC BLOOD PRESSURE: 78 MMHG | HEIGHT: 61 IN | BODY MASS INDEX: 29.27 KG/M2

## 2024-03-26 VITALS — SYSTOLIC BLOOD PRESSURE: 102 MMHG | DIASTOLIC BLOOD PRESSURE: 70 MMHG

## 2024-03-26 DIAGNOSIS — M17.11 UNILATERAL PRIMARY OSTEOARTHRITIS, RIGHT KNEE: ICD-10-CM

## 2024-03-26 DIAGNOSIS — M75.41 IMPINGEMENT SYNDROME OF RIGHT SHOULDER: ICD-10-CM

## 2024-03-26 DIAGNOSIS — M89.9 DISORDER OF BONE, UNSPECIFIED: ICD-10-CM

## 2024-03-26 DIAGNOSIS — M19.011 PRIMARY OSTEOARTHRITIS, RIGHT SHOULDER: ICD-10-CM

## 2024-03-26 PROCEDURE — G2211 COMPLEX E/M VISIT ADD ON: CPT | Mod: NC,1L

## 2024-03-26 PROCEDURE — 20611 DRAIN/INJ JOINT/BURSA W/US: CPT | Mod: LT

## 2024-03-26 PROCEDURE — 99215 OFFICE O/P EST HI 40 MIN: CPT | Mod: 25

## 2024-04-13 PROBLEM — M75.41 IMPINGEMENT SYNDROME OF RIGHT SHOULDER: Status: ACTIVE | Noted: 2021-07-13

## 2024-04-13 PROBLEM — M89.9 SCAPULAR DYSFUNCTION: Status: ACTIVE | Noted: 2023-03-17

## 2024-04-13 PROBLEM — M17.11 PATELLOFEMORAL ARTHRITIS OF RIGHT KNEE: Status: ACTIVE | Noted: 2023-02-13

## 2024-04-13 PROBLEM — M19.011 ARTHRITIS OF RIGHT ACROMIOCLAVICULAR JOINT: Status: ACTIVE | Noted: 2023-03-07

## 2024-04-13 NOTE — ASSESSMENT
[FreeTextEntry1] :                                                       Assessment/Plan:  DEEPTI RDZ is a 43 year female with acute on chronic right knee pain here for follow up visit now with LEFT shoulder pain  AC Joint Osteoarthritis, right > LEFT  Subacromial bursitis, left Tendinopathy of rotator cuff, left Decreased range of motion of shoulder, left  Subacromial bursitis, right Impingement syndrome, right Scapular dyskinesis, right Postural imbalance  Osteoarthritis of knee, right Patellofemoral arthritis, right Patellofemoral pain syndrome, right Internal derangement of meniscus, right Decreased range of motion of knee, right Weakness of the hip, right  - 03/26/2024 tolerated left ACJ steroid injection well in office without complications - Tiers of treatment and management of above diagnosis(es) were discussed with patient - Optimal diet, weight, sleep, and lifestyle management to minimize stress and maximize well being counseling provided - Imaging reviewed and discussed with patient - Reviewed previous encounter notes from 2/7/2022 Dr. JEFFREY Farris ([facility]) - Mar 05, 2024: Patient was advised to resume a structured, targeted therapy program 2-3x/wk for 8 wks with goal toward HEP for the LEFT SHOULDER - Patient was educated on an appropriate home exercise program, provided with exercise recommendations, all questions answered - Regarding follow up on March 17, 2023: Patient rude to staff, left without allowing for the ultrasound of right shoulder, patient presented hours prior to her visit and as a courtesy writer had her roomed and exam started, patient was offered food on multiple occasions by staff - Patient was advised to start Meloxicam 15 mg daily with food/milk for 5-7 days to help with pain and to decrease inflammation, afterwards as needed. Rx sent with famotidine and written instructions - Patient may trial topical compound cream to the right knee/right shoulder no more than twice per day as needed for next 2-3 weeks, Rx entered via portal again today 5/8/2023, written information provided to the patient in addition to verbal explanation regarding the medication - Patient was advised to apply cool compresses or warm heat to affected regions PRN  - Educated about red flag symptoms including (but not limited to) new, worsened, or persistent: fever greater than 100F, bowel or bladder incontinence, bowel obstipation, inability to void urine, urinary leakage, Severe nausea or vomiting, Worsening numbness, worsening tingling/paresthesias, and/or new or progressive motor weakness; advised to seek immediate medical attention at his nearest Emergency department should they experience any of the above  - Follow up in 2-3 weeks after trial of PT, anti-inflammatory medications, and home exercises  I have personally spent a total of at least 45 minutes preparing, reviewing internal and external records, explaining, counseling, and coordinating care for this patient encounter.  Thank you, ZURI FERMIN, for allowing me to participate in the care of your patient. Please do not hesitate to contact me with questions/concerns.  Salty Swift M.D. Sports and Interventional Spine Department of Physical Medicine and Rehabilitation  [facility]edic [facility] [address] [place] [address]  Appointments: [phone] Fax: [phone]

## 2024-04-13 NOTE — PROCEDURE
[de-identified] : PROCEDURE: LEFT ultrasound guided acromioclavicular joint steroid Injection 03/26/2024  DEEPTI RDZ Nov 25 1980  [id number]   Performing Physician: Salty Swift MD Hand Dominance: LEFT Findings: trace effusion was present. There was also some thickening of the subacromial bursa using a 12 mHz ultrasound tranducer Description: Risks, benefits and alternatives to the procedure were discussed with patient. All questions were answered. The findings are above. After consent was obtained, the skin over this area was prepped with chlorhexidine. 1-1.5 mL of 1& Lidocaine was used for local anesthetic. A 25 gauge 1.5 inch needle was then advanced into the LEFT subacromial region in an in-plane, long axis approach under direct ultrasound visualization. 5 mL of 1% Lidocaine and 40 mg of Kenalog was then injected. Excellent flow of fluid was noted in the subacromial space. There was no bleeding or complication noted. The patient tolerated the procedure well and was instructed to use cool compresses for 15 minute intervals PRN.

## 2024-04-13 NOTE — PHYSICAL EXAM
[FreeTextEntry1] : GEN: NAD, well dressed, well nourished HEENT: NCAT, oropharynx clear CV: S1S2, RRR RESP: on room air, no labored breathing ABD: non distended EXT: well perfused, no calf tenderness  RIGHT KNEE: no scars trace effusion no laceration no increased warmth no erythema no varus deformity no valgus deformity absent J sign  ROM Full range of motion in extension, no flexion contracture No added AROM/PROM with knee extension Nearly full range of motion in flexion, 0-120  Palpation + crepitus neg TTP over the quadriceps tendon neg TTP over the base of the patella ++TTP over the apex of the patella neg TTP over medial border of the patella neg TTP over lateral border of the patella +TTP over the medial joint line +TTP over the lateral joint line neg TTP over pes anserine area in the medial face of the tibia neg TTP over tibial tuberosity neg TTP over fibular head neg TTP over the popliteal fossa + TTP over the patellar tendon Manual Muscle Testing 4/5 in all planes with resisted isometric stress  +laxity with varus stress with the knee extended at 0 +laxity with varus stress with the knee extended at 30 neg laxity with valgus stress with the knee extended at 0 neg laxity with valgus stress with the knee extended at 30 neg Lachmann Test neg Anterior drawer neg Posterior drawer neg Campa's Sign neg Patellar apprehension neg Apley's Rotation-Distraction Test (increased rotation, ligamentous) vs contralateral limb neg Apley's Rotation-Compression Test (decreased rotation, meniscal) vs contralateral limb neg Arsenio Test with Tibia Externally Rotated (MM) neg Arsenio Test with Tibia Internally Rotated (LM)  Sensation intact to light touch over all aspects of the right lower leg Distal pulses intact  LEFT KNEE: no scars no effusion no laceration no increased warmth no erythema no varus deformity no valgus deformity absent  J sign  ROM Full range of motion in extension, no flexion contracture No added AROM/PROM with knee extension Full range of motion in flexion, 0-135  Palpation + crepitus neg TTP over the quadriceps tendon neg TTP over the base of the patella neg TTP over the apex of the patella neg TTP over medial border of the patella neg TTP over lateral border of the patella neg TTP over the medial joint line neg TTP over the lateral joint line neg TTP over pes anserine area in the medial face of the tibia neg TTP over tibial tuberosity neg TTP over fibular head neg TTP over the popliteal fossa  Manual Muscle Testing 5/5 in all planes with resisted isometric stress  neg laxity with varus stress with the knee extended at 0 neg laxity with varus stress with the knee extended at 30 neg laxity with valgus stress with the knee extended at 0 neg laxity with valgus stress with the knee extended at 30 neg Lachmann Test neg Anterior drawer neg Posterior drawer neg Campa's Sign neg Patellar apprehension neg Apley's Rotation-Distraction Test (increased rotation, ligamentous) vs contralateral limb neg Apley's Rotation-Compression Test (decreased rotation, meniscal) vs contralateral limb neg Arsenio Test with Tibia Externally Rotated (MM) neg Arsenio Test with Tibia Internally Rotated (LM) neg Thessaly Test  Sensation intact to light touch over all aspects of the right lower leg Distal pulses intact  + Antalgic gait   LEFT SHOULDER: neg effusion neg scars or lacerations or lesions neg increased warmth neg scapular winging neg muscle atrophy  Palpation: no TTP over sterna-clavicular joint no TTP over clavicle no TTP over coracoid process no TTP over sternum Mar 05, 2024 +TTP over AC joint Mar 05, 2024 +TTP over humerus no TTP over the spine of the scapula no TTP over the medial border of the scapula, superior angle, inferior angle, lateral border Mar 05, 2024 +TTP over supraspinatus Mar 05, 2024 +TTP over infraspinatus no TTP over upper trapezius Mar 05, 2024 +TTP over deltoid muscle no TTP in the axilla neg crepitus with PROM shoulder  AROM: active range of motion limited as below  scapulohumeral rhythm was smooth, appropriate   Abduction 125/170-180 Forward Flexion 125/160-180 Elevation through the plane of the scapula 120/170-180 External Rotation 40/80-90 Internal Rotation 60/ Extension 25/50-60 Adduction 45/50-75  PROM consistent with above  Assessment of access (for OVERHEAD/INCLINE PRESS) on the LEFT demonstrates inability to forward flex, externally rotate shoulder beyond 130 degrees without compensatory: + thoracic extension + thoracic rotation of the contralateral side towards the ipsilateral shoulder + postural kyphosis   PROM consistent with above  neg sulcus sign Mar 05, 2024 ++Neer test Mar 05, 2024 +Griffin test Mar 05, 2024 ++Allison's test neg Speed's test neg Yergason's test neg drop arm test Mar 05, 2024 + empty can test neg Burnett's Test neg external rotation lag sign neg lift off sign neg hornblower's sign neg Horizontal adduction test  Sensation to light touch intact over all dermatomes about the shoulder Distal pulses present  RIGHT SHOULDER: neg effusion neg scars or lacerations or lesions neg increased warmth neg scapular winging neg muscle atrophy  Palpation: no TTP over sterna-clavicular joint no TTP over clavicle no TTP over coracoid process no TTP over sternum no TTP over AC joint +TTP over humerus no TTP over the spine of the scapula no TTP over the medial border of the scapula, superior angle, inferior angle, lateral border +TTP over supraspinatus no TTP over infraspinatus + TTP over upper trapezius +TTP over deltoid muscle no TTP in the axilla neg crepitus with PROM shoulder  AROM: active range of motion limited in most planes as below scapulohumeral rhythm was not smooth, appropriate   Abduction 130/170-180 Forward Flexion 130/160-180 Elevation through the plane of the scapula 130/170-180 External Rotation 45/80-90 Internal Rotation 45/ Extension 25/50-60 Adduction 25/50-75  PROM consistent with above  neg sulcus sign +Neer test +Griffin test +Allison's test neg Speed's test neg Yergason's test neg drop arm test +empty can test +Burnett's Test neg external rotation lag sign neg lift off sign neg hornblower's sign neg Horizontal adduction test  Sensation to light touch intact over all dermatomes about the shoulder Distal pulses present   Manual Muscle Testing   	C5 (Shoulder Abduction)        C5 (Elbow Flex)	        C6 (Wrist Ext)    Right	4/5	                                5/5	                          5/5	        Left	4/5	                                4/5	                          5/5	            	C7 (Elbow Ext)            C7 (Wrist Flex)             C8 (Long Finger Flex)          T1 (Finger Abduct)            Right	5/5	                    5/5                                      5/5	                                      5/5	                                                  Left	4/5	                    5/5                                      5/5	                                      5/5	                                 	C8 (Thumb Ext)            C8 & T1 (Thumb Opp)             Right	5/5	                           5/5	                                                  Left	5/5	                           5/5                               Resisted Internal Rotation Right 4/5 Left 4-/5  Resisted External Rotation Right 4/5 Left 4-/5

## 2024-04-13 NOTE — HISTORY OF PRESENT ILLNESS
[FreeTextEntry1] : Salty Swift M.D. Sports Medicine and Interventional Spine Department of Physical Medicine and Rehabilitation  [facility] [address] [place] [address]  [facility] [place] [facility] [address] [place] [address]  For [facility] Appointments Phone: [phone] Fax: [phone]  ----------------------------------------------------------------------------------------------------------------------------------------  PATIENT: DEEPTI RDZ  MRN: [id number]  YOB: 1980  DATE OF Service: Mar 26, 2024  Referred by ZURI FERMIN PCP ADDRESS:  Mar 26, 2024   Dear    Thank you for referring DEEPTI RDZ to my Sports and Interventional Spine practice and office. Enclosed is a copy of the patient's consultation/progress note, which includes my complete assessment and recent studies completed during the patient's evaluation.  If you have questions or have any patients who require nonsurgical, non-opiate management of any sports, spine, or musculoskeletal conditions, please do not hesitate to contact my , Ashley Liu at [phone].  I look forward to taking care of your patients along with you.  Sincerely,  Salty Swift MD Sports, Interventional Spine, & Regenerative Musculoskeletal Medicine [facility] [place] [facility]                                                     Follow Up Visit CC: right knee pain   HPI:  This is a follow up visit to [facility] [place] [facility] Sports Medicine and Interventional Spine Practice.    DEEPTI RDZ presents with the chief complaint as above.    Interval Hx on Mar 26, 2024: presents for follow up. Since last visit, her left shoulder pain persists. patient has been attending PT at [address] [place] [address] PT. patient has attended 6 sessions of PT for the shoulder. patient believes the therapy is not helping significantly. patient has been doing home exercises. pain is rated now at 8-9/10, more than previous visit. patient is not taking antibiotics at this time. patient had been trying oral NSAIDs including meloxicam which helped temporarily. There have been no significant changes to their aggravating or alleviating factors since the last visit. Pharmacologic treatments now include OTC analgesics PRN, but otherwise pharmacologic treatments are minimal. Denies new or worsened numbness, tingling, or focal motor deficit. Denies interval fall, accident, or injury. patient having minimal elbow pain since PT and oral NSAIDs.    Meds: denies regular PO pain medications Therapy Program: no recent structured targeted therapy program HEP: doing HEP regularly  Assoc Sx: Denies numbness, Tingling Denies Focal motor weakness in the upper or lower limbs Denies New or worsened bowel or bladder incontinence Denies Saddle anesthesia Denies Buckling Denies Using Orthotic(s)/Supportive devices Denies Swelling in the upper/lower extremities Denies Clicking They also deny frequent tripping, falling  ROS: A 14 point review of systems was completed. Positive findings are pain as described above. The remaining systems negative.  COVID HX: reviewed  Interval Hx on Mar 05, 2024: presents for follow up. Since last visit, they developed left shoulder pain including pain with reaching, overhead movements, and at times without exertional related pain. patient cannot cite any inciting even, patient has alleviated her pain with activity modification, missing work, and rare OTC analgesics. pain started three months ago, seems to be partly related to their work activities. Since the last visit, prior to onset of the LEFT shoulder pain, they relate improvements in B/L knee and RIGHT pain previously associated with known exacerbating, aggravating factors and situations. Pharmacologic treatments now include OTC analgesics PRN, but otherwise pharmacologic treatments are minimal. Denies new or worsened numbness, tingling, or focal motor deficit. Denies interval fall, accident, or injury. Denies change in bowel or bladder functioning.  Interval Hx on May 08, 2023: presents for follow up. Since last visit, patient has had about 5 sessions of PT, [facility], focusing mostly on the legs. patient has been doing HEP regularly. patient has been interested in developing further HEP. Since the last visit, they relate improvements in pain previously associated with known exacerbating, aggravating factors and situations. Pharmacologic treatments now include OTC analgesics PRN, but otherwise pharmacologic treatments are minimal. Denies new or worsened numbness, tingling, or focal motor deficit. Denies interval fall, accident, or injury. Denies change in bowel or bladder functioning. 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 Patient believes their right shoulder pain is worsening. Patient has felt improved relief with injection treatment in the past but has not tried gel injections  Interval Hx on Mar 17, 2023: presents for follow up. Since last visit, they underwent radiographs of the right shoulder. Patient reports pain relief for a few days then less but consistent pain in the right knee. Shoulder pain is worse since the last visit. Since the last visit, they relate improvements in pain previously associated with known exacerbating, aggravating factors and situations. Pain in shoulder has been happening for years, recently worsening. patient has difficulty doing their activities related to their job. Pharmacologic treatments now include OTC analgesics PRN, but otherwise pharmacologic treatments are minimal. Denies new or worsened numbness, tingling, or focal motor deficit. Denies interval fall, accident, or injury. Denies change in bowel or bladder functioning.  Interval Hx on Mar 07, 2023: presents for follow up.  [id number] Oli. Since last visit, they continue to have right > left knee pain, worse with standing, worse with stair climbing, patient has relief with taking breaks. Patient started PT since their last visit, patient has had >4 sessions at [facility] PT in [place], [place]; also having relief with medication and PT treatments.  There have been no significant changes to their aggravating or alleviating factors since the last visit. Pharmacologic treatments now include OTC analgesics PRN, but otherwise pharmacologic treatments are minimal. Denies new or worsened numbness, tingling, or focal motor deficit. Denies interval fall, accident, or injury. Patient relates that her AM blood glucose has been better controlled 130-135 most of the time recently in the AM, taking insulin daily but does not check her levels.  Patient reports increasing frequency of severe pain as well as increasingly severe intensity of pain. Patient reports having difficulty with ambulation indoors due to pain and stiffness from the right knee. Patient reports difficulty with everyday activities including reaching down/bending forward for lower body dressing, toileting, hygiene, and transferring about surfaces within her home for sitting, resting.  Initial Hx on 02/13/2023: Presents in person to [facility]. patient reports no injury or inciting event for the right knee. patient reports worsening pain over the past several months, now has difficulty with their job due to the knee pain. patient has been to their PCP, using recommended ointment to the right knee. pain occurs over the anterior right knee over the medial joint line. The patient's difficulties began 6 months ago. The pain is graded as moderate to severe. The pain is described as sharp. The pain is intermittent. The pain does not radiate. The patient feels that the pain is overall persistent. Patient denies other recent fall, MVA, injury, trauma, or accident besides presenting history above. Patient suffers from diabetes, usually 200-250 in the morning. Aggravating: ambulation, prolonged sitting, prolonged standing, navigating stairs, sit to stand transitional movements, getting out of bed Alleviating: rest, activity modification, avoiding prolonged walking, pharmacologic treatments  Assoc Hx: Ambulates without assistive device Injection Hx: denies locally directed treatment to the area in question Imaging Hx: reviewed  Level of functioning: indep with ambulation, indep with ADLs Living Situation: dwelling with steps to enter

## 2024-04-23 ENCOUNTER — APPOINTMENT (OUTPATIENT)
Dept: PHYSICAL MEDICINE AND REHAB | Facility: CLINIC | Age: 44
End: 2024-04-23

## 2024-05-10 ENCOUNTER — APPOINTMENT (OUTPATIENT)
Dept: PHYSICAL MEDICINE AND REHAB | Facility: CLINIC | Age: 44
End: 2024-05-10
Payer: MEDICAID

## 2024-05-10 VITALS
HEIGHT: 61 IN | WEIGHT: 155 LBS | SYSTOLIC BLOOD PRESSURE: 109 MMHG | DIASTOLIC BLOOD PRESSURE: 75 MMHG | BODY MASS INDEX: 29.27 KG/M2 | OXYGEN SATURATION: 97 % | HEART RATE: 117 BPM | TEMPERATURE: 97.88 F

## 2024-05-10 DIAGNOSIS — M75.52 BURSITIS OF LEFT SHOULDER: ICD-10-CM

## 2024-05-10 DIAGNOSIS — S49.92XS UNSPECIFIED INJURY OF LEFT SHOULDER AND UPPER ARM, SEQUELA: ICD-10-CM

## 2024-05-10 DIAGNOSIS — R26.89 OTHER ABNORMALITIES OF GAIT AND MOBILITY: ICD-10-CM

## 2024-05-10 DIAGNOSIS — R29.898 OTHER SYMPTOMS AND SIGNS INVOLVING THE MUSCULOSKELETAL SYSTEM: ICD-10-CM

## 2024-05-10 DIAGNOSIS — M75.42 IMPINGEMENT SYNDROME OF LEFT SHOULDER: ICD-10-CM

## 2024-05-10 DIAGNOSIS — R29.3 ABNORMAL POSTURE: ICD-10-CM

## 2024-05-10 PROCEDURE — G2211 COMPLEX E/M VISIT ADD ON: CPT | Mod: NC,1L

## 2024-05-10 PROCEDURE — 99214 OFFICE O/P EST MOD 30 MIN: CPT

## 2024-05-10 RX ORDER — MELOXICAM 15 MG/1
15 TABLET ORAL
Qty: 14 | Refills: 0 | Status: ACTIVE | COMMUNITY
Start: 2024-03-05 | End: 1900-01-01

## 2024-05-10 NOTE — ASSESSMENT
[FreeTextEntry1] :                                                       Assessment/Plan:  DEEPTI RDZ is a 43 year female with acute on chronic right knee pain here for follow up visit now with LEFT shoulder pain  AC Joint Osteoarthritis, right > LEFT  Subacromial bursitis, right Impingement syndrome, right Scapular dyskinesis, right Postural imbalance  Subacromial bursitis, left Tendinopathy of rotator cuff, left Decreased range of motion of shoulder, left  Osteoarthritis of knee, right Patellofemoral arthritis, right Patellofemoral pain syndrome, right Internal derangement of meniscus, right Decreased range of motion of knee, right Weakness of the hip, right  - 03/26/2024 tolerated left ACJ steroid injection well in office without complications - Tiers of treatment and management of above diagnosis(es) were discussed with patient - Optimal diet, weight, sleep, and lifestyle management to minimize stress and maximize well being counseling provided - Imaging reviewed and discussed with patient - Reviewed previous encounter notes from 2/7/2022 Dr. JEFFREY Farris ([facility]) - Mar 05, 2024: Patient was advised to resume a structured, targeted therapy program 2-3x/wk for 8 wks with goal toward HEP for the LEFT SHOULDER - Patient was educated on an appropriate home exercise program, provided with exercise recommendations, all questions answered - Regarding follow up on March 17, 2023: Patient rude to staff, left without allowing for the ultrasound of right shoulder, patient presented hours prior to her visit and as a courtesy writer had her roomed and exam started, patient was offered food on multiple occasions by staff - May 10, 2024 RESUME MELOXICAM: Patient was advised to start Meloxicam 15 mg daily with food/milk for 5-7 days to help with pain and to decrease inflammation, afterwards as needed. Rx sent with famotidine and written instructions - Patient may trial topical compound cream to the right knee/right shoulder no more than twice per day as needed for next 2-3 weeks, Rx entered via portal again today 5/8/2023, written information provided to the patient in addition to verbal explanation regarding the medication - Patient was advised to apply cool compresses or warm heat to affected regions PRN  - 05/10/2024:  MRI left shoulder without contrast is indicated given that the pt has not improved with tylenol, ibuprofen, naproxen, meloxicam, s/p 3/5/2024 steroid injection to the left AC joint, they obtained non-diagnostic radiographs, and physical therapy/home exercise program>6 weeks. Patient's imaging is medically necessary to outline targets for locally (interventional) directed treatments and/or guide surgical management.  - Educated about red flag symptoms including (but not limited to) new, worsened, or persistent: fever greater than 100F, bowel or bladder incontinence, bowel obstipation, inability to void urine, urinary leakage, Severe nausea or vomiting, Worsening numbness, worsening tingling/paresthesias, and/or new or progressive motor weakness; advised to seek immediate medical attention at his nearest Emergency department should they experience any of the above  - Follow up in 2-3 weeks after MRI of the left shoulder  I have personally spent a total of at least 45 minutes preparing, reviewing internal and external records, explaining, counseling, and coordinating care for this patient encounter.  Thank you, ZURI FERMIN, for allowing me to participate in the care of your patient. Please do not hesitate to contact me with questions/concerns.  Salty Swift M.D. Sports and Interventional Spine Department of Physical Medicine and Rehabilitation  [facility] Physician [facility] Orthopa[facility] [place] [facility] [address] [place] [address]  Appointments: [phone] Fax: [phone]

## 2024-05-10 NOTE — PHYSICAL EXAM
[FreeTextEntry1] : GEN: NAD, well dressed, well nourished HEENT: NCAT, oropharynx clear CV: S1S2, RRR RESP: on room air, no labored breathing ABD: non distended EXT: well perfused, no calf tenderness  RIGHT KNEE: no scars trace effusion no laceration no increased warmth no erythema no varus deformity no valgus deformity absent J sign  ROM Full range of motion in extension, no flexion contracture No added AROM/PROM with knee extension Nearly full range of motion in flexion, 0-120  Palpation + crepitus neg TTP over the quadriceps tendon neg TTP over the base of the patella ++TTP over the apex of the patella neg TTP over medial border of the patella neg TTP over lateral border of the patella +TTP over the medial joint line +TTP over the lateral joint line neg TTP over pes anserine area in the medial face of the tibia neg TTP over tibial tuberosity neg TTP over fibular head neg TTP over the popliteal fossa + TTP over the patellar tendon Manual Muscle Testing 4/5 in all planes with resisted isometric stress  +laxity with varus stress with the knee extended at 0 +laxity with varus stress with the knee extended at 30 neg laxity with valgus stress with the knee extended at 0 neg laxity with valgus stress with the knee extended at 30 neg Lachmann Test neg Anterior drawer neg Posterior drawer neg Campa's Sign neg Patellar apprehension neg Apley's Rotation-Distraction Test (increased rotation, ligamentous) vs contralateral limb neg Apley's Rotation-Compression Test (decreased rotation, meniscal) vs contralateral limb neg Arsenio Test with Tibia Externally Rotated (MM) neg Arsenio Test with Tibia Internally Rotated (LM)  Sensation intact to light touch over all aspects of the right lower leg Distal pulses intact  LEFT KNEE: no scars no effusion no laceration no increased warmth no erythema no varus deformity no valgus deformity absent  J sign  ROM Full range of motion in extension, no flexion contracture No added AROM/PROM with knee extension Full range of motion in flexion, 0-135  Palpation + crepitus neg TTP over the quadriceps tendon neg TTP over the base of the patella neg TTP over the apex of the patella neg TTP over medial border of the patella neg TTP over lateral border of the patella neg TTP over the medial joint line neg TTP over the lateral joint line neg TTP over pes anserine area in the medial face of the tibia neg TTP over tibial tuberosity neg TTP over fibular head neg TTP over the popliteal fossa  Manual Muscle Testing 5/5 in all planes with resisted isometric stress  neg laxity with varus stress with the knee extended at 0 neg laxity with varus stress with the knee extended at 30 neg laxity with valgus stress with the knee extended at 0 neg laxity with valgus stress with the knee extended at 30 neg Lachmann Test neg Anterior drawer neg Posterior drawer neg Campa's Sign neg Patellar apprehension neg Apley's Rotation-Distraction Test (increased rotation, ligamentous) vs contralateral limb neg Apley's Rotation-Compression Test (decreased rotation, meniscal) vs contralateral limb neg Arsenio Test with Tibia Externally Rotated (MM) neg Arsenio Test with Tibia Internally Rotated (LM) neg Thessaly Test  Sensation intact to light touch over all aspects of the right lower leg Distal pulses intact  + Antalgic gait   LEFT SHOULDER: neg effusion neg scars or lacerations or lesions neg increased warmth neg scapular winging neg muscle atrophy  Palpation: no TTP over sterna-clavicular joint no TTP over clavicle no TTP over coracoid process no TTP over sternum persistent 05/10/2024 TTP over AC joint persistent 05/10/2024 TTP over humerus no TTP over the spine of the scapula no TTP over the medial border of the scapula, superior angle, inferior angle, lateral border persistent 05/10/2024 +TTP over supraspinatus persistent 05/10/2024 +TTP over infraspinatus no TTP over upper trapezius persistent 05/10/2024  +TTP over deltoid muscle no TTP in the axilla neg crepitus with PROM shoulder  AROM: active range of motion limited as below  scapulohumeral rhythm was smooth, appropriate   Abduction 125/170-180 Forward Flexion 125/160-180 Elevation through the plane of the scapula 120/170-180 External Rotation 40/80-90 Internal Rotation 60/ Extension 25/50-60 Adduction 45/50-75  PROM consistent with above  Assessment of access (for OVERHEAD/INCLINE PRESS) on the LEFT demonstrates inability to forward flex, externally rotate shoulder beyond 130 degrees without compensatory: + thoracic extension + thoracic rotation of the contralateral side towards the ipsilateral shoulder + postural kyphosis   PROM consistent with above  neg sulcus sign persistent 05/10/2024 ++Neer test persistent 05/10/2024 +Griffin test persistent 05/10/2024 ++Allison's test neg Speed's test neg Yergason's test neg drop arm test persistent 05/10/2024 + empty can test neg Minor Hill's Test neg external rotation lag sign neg lift off sign neg hornblower's sign neg Horizontal adduction test  Sensation to light touch intact over all dermatomes about the shoulder Distal pulses present  RIGHT SHOULDER: neg effusion neg scars or lacerations or lesions neg increased warmth neg scapular winging neg muscle atrophy  Palpation: no TTP over sterna-clavicular joint no TTP over clavicle no TTP over coracoid process no TTP over sternum no TTP over AC joint +TTP over humerus no TTP over the spine of the scapula no TTP over the medial border of the scapula, superior angle, inferior angle, lateral border +TTP over supraspinatus no TTP over infraspinatus + TTP over upper trapezius +TTP over deltoid muscle no TTP in the axilla neg crepitus with PROM shoulder  AROM: active range of motion limited in most planes as below scapulohumeral rhythm was not smooth, appropriate   Abduction 130/170-180 Forward Flexion 130/160-180 Elevation through the plane of the scapula 130/170-180 External Rotation 45/80-90 Internal Rotation 45/ Extension 25/50-60 Adduction 25/50-75  PROM consistent with above  neg sulcus sign +Neer test +Griffin test +Allison's test neg Speed's test neg Yergason's test neg drop arm test +empty can test +Minor Hill's Test neg external rotation lag sign neg lift off sign neg hornblower's sign neg Horizontal adduction test  Sensation to light touch intact over all dermatomes about the shoulder Distal pulses present   Manual Muscle Testing   	C5 (Shoulder Abduction)        C5 (Elbow Flex)	        C6 (Wrist Ext)    Right	4/5	                                5/5	                          5/5	        Left	4/5	                                4/5	                          5/5	            	C7 (Elbow Ext)            C7 (Wrist Flex)             C8 (Long Finger Flex)          T1 (Finger Abduct)            Right	5/5	                    5/5                                      5/5	                                      5/5	                                                  Left	4/5	                    5/5                                      5/5	                                      5/5	                                 	C8 (Thumb Ext)            C8 & T1 (Thumb Opp)             Right	5/5	                           5/5	                                                  Left	5/5	                           5/5                               Resisted Internal Rotation Right 4/5 Left 4-/5  Resisted External Rotation Right 4/5 Left 4-/5

## 2024-05-10 NOTE — PROCEDURE
[de-identified] : PROCEDURE: LEFT ultrasound guided acromioclavicular joint steroid Injection 03/26/2024  DEEPTI RDZ Nov 25 1980  [id number]   Performing Physician: Salty Swift MD Hand Dominance: LEFT Findings: trace effusion was present. There was also some thickening of the subacromial bursa using a 12 mHz ultrasound tranducer Description: Risks, benefits and alternatives to the procedure were discussed with patient. All questions were answered. The findings are above. After consent was obtained, the skin over this area was prepped with chlorhexidine. 1-1.5 mL of 1& Lidocaine was used for local anesthetic. A 25 gauge 1.5 inch needle was then advanced into the LEFT subacromial region in an in-plane, long axis approach under direct ultrasound visualization. 5 mL of 1% Lidocaine and 40 mg of Kenalog was then injected. Excellent flow of fluid was noted in the subacromial space. There was no bleeding or complication noted. The patient tolerated the procedure well and was instructed to use cool compresses for 15 minute intervals PRN.

## 2024-05-10 NOTE — HISTORY OF PRESENT ILLNESS
[FreeTextEntry1] : Salty Swift M.D. Sports Medicine and Interventional Spine Department of Physical Medicine and Rehabilitation  [facility] [address] [place] [address]  [facility] [place] [facility] [address] [place] [address]  For [place] Appointments Phone: [phone] Fax: [phone]  ----------------------------------------------------------------------------------------------------------------------------------------  PATIENT: DEEPTI RDZ  MRN: [id number]  YOB: 1980  DATE OF Service: May 10, 2024  Referred by ZURI FERMIN PCP ADDRESS:  May 10, 2024   Dear    Thank you for referring DEEPTI RDZ to my Sports and Interventional Spine practice and office. Enclosed is a copy of the patient's consultation/progress note, which includes my complete assessment and recent studies completed during the patient's evaluation.  If you have questions or have any patients who require nonsurgical, non-opiate management of any sports, spine, or musculoskeletal conditions, please do not hesitate to contact my , Ashley Liu at [phone].  I look forward to taking care of your patients along with you.  Sincerely,  Salty Swift MD Sports, Interventional Spine, & Regenerative Musculoskeletal Medicine [facility] [place] [facility]                                                     Follow Up Visit CC: right knee pain   HPI:  This is a follow up visit to [facility] [place] [facility] Medicine and Interventional Spine Practice.    DEEPTI RDZ presents with the chief complaint as above.    Interval Hx on May 10, 2024: presents for follow up. At the last visit, patient was provided with a left AC joint corticosteroid injection. patient had substantial pain relief for a few weeks, patient shares that they were unable to work for one month due to pain. patient shares minimal PT since the last visit as well as viral illness for the past two weeks. patient also having marked elevation in blood sugar. recent trends between 200-300s preclude further local treatment with corticosteroids. Patient reports increasing frequency of severe pain as well as increasingly severe intensity of pain. Patient reports having difficulty with ambulation indoors due to pain and stiffness from the left shoulder. Patient reports difficulty with everyday activities including reaching down/bending forward for lower body dressing, toileting, hygiene, and transferring about surfaces within her home. There have been no significant changes to their aggravating or alleviating factors since the last visit. Pharmacologic treatments now include OTC analgesics PRN, but otherwise pharmacologic treatments are minimal. Denies new or worsened numbness, tingling, or focal motor deficit. Denies interval fall, accident, or injury. Denies change in bowel or bladder functioning.    Meds: denies regular PO pain medications Therapy Program: no recent structured targeted therapy program HEP: doing HEP regularly  Assoc Sx: Denies numbness, Tingling Denies Focal motor weakness in the upper or lower limbs Denies New or worsened bowel or bladder incontinence Denies Saddle anesthesia Denies Buckling Denies Using Orthotic(s)/Supportive devices Denies Swelling in the upper/lower extremities Denies Clicking They also deny frequent tripping, falling  ROS: A 14 point review of systems was completed. Positive findings are pain as described above. The remaining systems negative.  COVID HX: reviewed  Interval Hx on Mar 26, 2024: presents for follow up. Since last visit, her left shoulder pain persists. patient has been attending PT at [address] [place] [address] PT. patient has attended 6 sessions of PT for the shoulder. patient believes the therapy is not helping significantly. patient has been doing home exercises. pain is rated now at 8-9/10, more than previous visit. patient is not taking antibiotics at this time. patient had been trying oral NSAIDs including meloxicam which helped temporarily. There have been no significant changes to their aggravating or alleviating factors since the last visit. Pharmacologic treatments now include OTC analgesics PRN, but otherwise pharmacologic treatments are minimal. Denies new or worsened numbness, tingling, or focal motor deficit. Denies interval fall, accident, or injury. patient having minimal elbow pain since PT and oral NSAIDs.   Interval Hx on Mar 05, 2024: presents for follow up. Since last visit, they developed left shoulder pain including pain with reaching, overhead movements, and at times without exertional related pain. patient cannot cite any inciting even, patient has alleviated her pain with activity modification, missing work, and rare OTC analgesics. pain started three months ago, seems to be partly related to their work activities. Since the last visit, prior to onset of the LEFT shoulder pain, they relate improvements in B/L knee and RIGHT pain previously associated with known exacerbating, aggravating factors and situations. Pharmacologic treatments now include OTC analgesics PRN, but otherwise pharmacologic treatments are minimal. Denies new or worsened numbness, tingling, or focal motor deficit. Denies interval fall, accident, or injury. Denies change in bowel or bladder functioning.  Interval Hx on May 08, 2023: presents for follow up. Since last visit, patient has had about 5 sessions of PT, [facility], focusing mostly on the legs. patient has been doing HEP regularly. patient has been interested in developing further HEP. Since the last visit, they relate improvements in pain previously associated with known exacerbating, aggravating factors and situations. Pharmacologic treatments now include OTC analgesics PRN, but otherwise pharmacologic treatments are minimal. Denies new or worsened numbness, tingling, or focal motor deficit. Denies interval fall, accident, or injury. Denies change in bowel or bladder functioning. 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 Patient believes their right shoulder pain is worsening. Patient has felt improved relief with injection treatment in the past but has not tried gel injections  Interval Hx on Mar 17, 2023: presents for follow up. Since last visit, they underwent radiographs of the right shoulder. Patient reports pain relief for a few days then less but consistent pain in the right knee. Shoulder pain is worse since the last visit. Since the last visit, they relate improvements in pain previously associated with known exacerbating, aggravating factors and situations. Pain in shoulder has been happening for years, recently worsening. patient has difficulty doing their activities related to their job. Pharmacologic treatments now include OTC analgesics PRN, but otherwise pharmacologic treatments are minimal. Denies new or worsened numbness, tingling, or focal motor deficit. Denies interval fall, accident, or injury. Denies change in bowel or bladder functioning.  Interval Hx on Mar 07, 2023: presents for follow up.  [id number] Oli. Since last visit, they continue to have right > left knee pain, worse with standing, worse with stair climbing, patient has relief with taking breaks. Patient started PT since their last visit, patient has had >4 sessions at [facility] in [place], [place]; also having relief with medication and PT treatments.  There have been no significant changes to their aggravating or alleviating factors since the last visit. Pharmacologic treatments now include OTC analgesics PRN, but otherwise pharmacologic treatments are minimal. Denies new or worsened numbness, tingling, or focal motor deficit. Denies interval fall, accident, or injury. Patient relates that her AM blood glucose has been better controlled 130-135 most of the time recently in the AM, taking insulin daily but does not check her levels.  Patient reports increasing frequency of severe pain as well as increasingly severe intensity of pain. Patient reports having difficulty with ambulation indoors due to pain and stiffness from the right knee. Patient reports difficulty with everyday activities including reaching down/bending forward for lower body dressing, toileting, hygiene, and transferring about surfaces within her home for sitting, resting.  Initial Hx on 02/13/2023: Presents in person to [facility]. patient reports no injury or inciting event for the right knee. patient reports worsening pain over the past several months, now has difficulty with their job due to the knee pain. patient has been to their PCP, using recommended ointment to the right knee. pain occurs over the anterior right knee over the medial joint line. The patient's difficulties began 6 months ago. The pain is graded as moderate to severe. The pain is described as sharp. The pain is intermittent. The pain does not radiate. The patient feels that the pain is overall persistent. Patient denies other recent fall, MVA, injury, trauma, or accident besides presenting history above. Patient suffers from diabetes, usually 200-250 in the morning. Aggravating: ambulation, prolonged sitting, prolonged standing, navigating stairs, sit to stand transitional movements, getting out of bed Alleviating: rest, activity modification, avoiding prolonged walking, pharmacologic treatments  Assoc Hx: Ambulates without assistive device Injection Hx: denies locally directed treatment to the area in question Imaging Hx: reviewed  Level of functioning: indep with ambulation, indep with ADLs Living Situation: dwelling with steps to enter

## 2024-06-07 ENCOUNTER — APPOINTMENT (OUTPATIENT)
Dept: PHYSICAL MEDICINE AND REHAB | Facility: CLINIC | Age: 44
End: 2024-06-07
Payer: MEDICAID

## 2024-06-07 VITALS
WEIGHT: 155 LBS | DIASTOLIC BLOOD PRESSURE: 70 MMHG | HEIGHT: 61 IN | SYSTOLIC BLOOD PRESSURE: 103 MMHG | OXYGEN SATURATION: 95 % | HEART RATE: 102 BPM | TEMPERATURE: 98.06 F | BODY MASS INDEX: 29.27 KG/M2

## 2024-06-07 VITALS — DIASTOLIC BLOOD PRESSURE: 72 MMHG | SYSTOLIC BLOOD PRESSURE: 108 MMHG

## 2024-06-07 DIAGNOSIS — M17.11 UNILATERAL PRIMARY OSTEOARTHRITIS, RIGHT KNEE: ICD-10-CM

## 2024-06-07 DIAGNOSIS — M12.811 OTHER SPECIFIC ARTHROPATHIES, NOT ELSEWHERE CLASSIFIED, RIGHT SHOULDER: ICD-10-CM

## 2024-06-07 DIAGNOSIS — M75.82 OTHER SHOULDER LESIONS, LEFT SHOULDER: ICD-10-CM

## 2024-06-07 DIAGNOSIS — M75.01 ADHESIVE CAPSULITIS OF RIGHT SHOULDER: ICD-10-CM

## 2024-06-07 DIAGNOSIS — M12.812 OTHER SPECIFIC ARTHROPATHIES, NOT ELSEWHERE CLASSIFIED, LEFT SHOULDER: ICD-10-CM

## 2024-06-07 DIAGNOSIS — M24.112 OTHER ARTICULAR CARTILAGE DISORDERS, LEFT SHOULDER: ICD-10-CM

## 2024-06-07 DIAGNOSIS — M75.02 ADHESIVE CAPSULITIS OF LEFT SHOULDER: ICD-10-CM

## 2024-06-07 PROCEDURE — G2211 COMPLEX E/M VISIT ADD ON: CPT | Mod: NC,1L

## 2024-06-07 PROCEDURE — 99215 OFFICE O/P EST HI 40 MIN: CPT | Mod: 25

## 2024-06-07 PROCEDURE — 20611 DRAIN/INJ JOINT/BURSA W/US: CPT | Mod: RT

## 2024-06-07 NOTE — PHYSICAL EXAM
[FreeTextEntry1] : GEN: NAD, well dressed, well nourished HEENT: NCAT, oropharynx clear CV: S1S2, RRR RESP: on room air, no labored breathing ABD: non distended EXT: well perfused, no calf tenderness  RIGHT KNEE: no scars trace effusion no laceration no increased warmth no erythema no varus deformity no valgus deformity absent J sign  ROM Full range of motion in extension, no flexion contracture No added AROM/PROM with knee extension Nearly full range of motion in flexion, 0-120  Palpation + crepitus neg TTP over the quadriceps tendon neg TTP over the base of the patella ++TTP over the apex of the patella neg TTP over medial border of the patella neg TTP over lateral border of the patella +TTP over the medial joint line +TTP over the lateral joint line neg TTP over pes anserine area in the medial face of the tibia neg TTP over tibial tuberosity neg TTP over fibular head neg TTP over the popliteal fossa + TTP over the patellar tendon Manual Muscle Testing 4/5 in all planes with resisted isometric stress  +laxity with varus stress with the knee extended at 0 +laxity with varus stress with the knee extended at 30 neg laxity with valgus stress with the knee extended at 0 neg laxity with valgus stress with the knee extended at 30 neg Lachmann Test neg Anterior drawer neg Posterior drawer neg Campa's Sign neg Patellar apprehension neg Apley's Rotation-Distraction Test (increased rotation, ligamentous) vs contralateral limb neg Apley's Rotation-Compression Test (decreased rotation, meniscal) vs contralateral limb neg Arsenio Test with Tibia Externally Rotated (MM) neg Arsenio Test with Tibia Internally Rotated (LM)  Sensation intact to light touch over all aspects of the right lower leg Distal pulses intact  LEFT KNEE: no scars no effusion no laceration no increased warmth no erythema no varus deformity no valgus deformity absent  J sign  ROM Full range of motion in extension, no flexion contracture No added AROM/PROM with knee extension Full range of motion in flexion, 0-135  Palpation + crepitus neg TTP over the quadriceps tendon neg TTP over the base of the patella neg TTP over the apex of the patella neg TTP over medial border of the patella neg TTP over lateral border of the patella neg TTP over the medial joint line neg TTP over the lateral joint line neg TTP over pes anserine area in the medial face of the tibia neg TTP over tibial tuberosity neg TTP over fibular head neg TTP over the popliteal fossa  Manual Muscle Testing 5/5 in all planes with resisted isometric stress  neg laxity with varus stress with the knee extended at 0 neg laxity with varus stress with the knee extended at 30 neg laxity with valgus stress with the knee extended at 0 neg laxity with valgus stress with the knee extended at 30 neg Lachmann Test neg Anterior drawer neg Posterior drawer neg Campa's Sign neg Patellar apprehension neg Apley's Rotation-Distraction Test (increased rotation, ligamentous) vs contralateral limb neg Apley's Rotation-Compression Test (decreased rotation, meniscal) vs contralateral limb neg Arsenio Test with Tibia Externally Rotated (MM) neg Arsenio Test with Tibia Internally Rotated (LM) neg Thessaly Test  Sensation intact to light touch over all aspects of the right lower leg Distal pulses intact  + Antalgic gait   LEFT SHOULDER: neg effusion neg scars or lacerations or lesions neg increased warmth neg scapular winging neg muscle atrophy  Palpation: no TTP over sterna-clavicular joint no TTP over clavicle no TTP over coracoid process no TTP over sternum persistent 05/10/2024 TTP over AC joint persistent 05/10/2024 TTP over humerus no TTP over the spine of the scapula no TTP over the medial border of the scapula, superior angle, inferior angle, lateral border persistent 05/10/2024 +TTP over supraspinatus persistent 05/10/2024 +TTP over infraspinatus no TTP over upper trapezius persistent 05/10/2024  +TTP over deltoid muscle no TTP in the axilla neg crepitus with PROM shoulder  AROM: active range of motion limited as below  scapulohumeral rhythm was smooth, appropriate   Abduction 125/170-180 Forward Flexion 125/160-180 Elevation through the plane of the scapula 120/170-180 External Rotation 40/80-90 Internal Rotation 60/ Extension 25/50-60 Adduction 45/50-75  PROM consistent with above  Assessment of access (for OVERHEAD/INCLINE PRESS) on the LEFT demonstrates inability to forward flex, externally rotate shoulder beyond 130 degrees without compensatory: + thoracic extension + thoracic rotation of the contralateral side towards the ipsilateral shoulder + postural kyphosis   PROM consistent with above  neg sulcus sign persistent 05/10/2024 ++Neer test persistent 05/10/2024 +Griffin test persistent 05/10/2024 ++Allison's test neg Speed's test neg Yergason's test neg drop arm test persistent 05/10/2024 + empty can test neg [place]'s Test neg external rotation lag sign neg lift off sign neg hornblower's sign neg Horizontal adduction test  Sensation to light touch intact over all dermatomes about the shoulder Distal pulses present  RIGHT SHOULDER: neg effusion neg scars or lacerations or lesions neg increased warmth neg scapular winging neg muscle atrophy  Palpation: no TTP over sterna-clavicular joint no TTP over clavicle no TTP over coracoid process no TTP over sternum no TTP over AC joint +TTP over humerus no TTP over the spine of the scapula no TTP over the medial border of the scapula, superior angle, inferior angle, lateral border +TTP over supraspinatus no TTP over infraspinatus + TTP over upper trapezius +TTP over deltoid muscle no TTP in the axilla neg crepitus with PROM shoulder  AROM: active range of motion limited in most planes as below scapulohumeral rhythm was not smooth, appropriate   Abduction 130/170-180 Forward Flexion 130/160-180 Elevation through the plane of the scapula 130/170-180 External Rotation 45/80-90 Internal Rotation 45/ Extension 25/50-60 Adduction 25/50-75  PROM consistent with above  neg sulcus sign +Neer test +Griffin test +Allison's test neg Speed's test neg Yergason's test neg drop arm test +empty can test +[place]'s Test neg external rotation lag sign neg lift off sign neg hornblower's sign neg Horizontal adduction test  Sensation to light touch intact over all dermatomes about the shoulder Distal pulses present   Manual Muscle Testing   	C5 (Shoulder Abduction)        C5 (Elbow Flex)	        C6 (Wrist Ext)    Right	4/5	                                5/5	                          5/5	        Left	4/5	                                4/5	                          5/5	            	C7 (Elbow Ext)            C7 (Wrist Flex)             C8 (Long Finger Flex)          T1 (Finger Abduct)            Right	5/5	                    5/5                                      5/5	                                      5/5	                                                  Left	4/5	                    5/5                                      5/5	                                      5/5	                                 	C8 (Thumb Ext)            C8 & T1 (Thumb Opp)             Right	5/5	                           5/5	                                                  Left	5/5	                           5/5                               Resisted Internal Rotation Right 4/5 Left 4-/5  Resisted External Rotation Right 4/5 Left 4-/5

## 2024-06-07 NOTE — HISTORY OF PRESENT ILLNESS
[FreeTextEntry1] : Salty Swift M.D. Sports Medicine and Interventional Spine Department of Physical Medicine and Rehabilitation  [facility] [address] [place] [address]  [facility] [place] [facility] [address], [address] [place] [address]  For [place] Appointments Phone: [phone] Fax: [phone]  ----------------------------------------------------------------------------------------------------------------------------------------  PATIENT: DEEPTI RDZ  MRN: [id number]  YOB: 1980  DATE OF Service: Jun 07, 2024   Referred by ZURI FERMIN PCP ADDRESS:  Jun 07, 2024   Dear    Thank you for referring DEEPTI RDZ to my Sports and Interventional Spine practice and office. Enclosed is a copy of the patient's consultation/progress note, which includes my complete assessment and recent studies completed during the patient's evaluation.  If you have questions or have any patients who require nonsurgical, non-opiate management of any sports, spine, or musculoskeletal conditions, please do not hesitate to contact my , Ashley Liu at [phone].  I look forward to taking care of your patients along with you.  Sincerely,  Salty Swift MD Sports, Interventional Spine, & Regenerative Musculoskeletal Medicine [facility] [place] at [facility]                                                     Follow Up Visit CC: right knee pain   HPI:  This is a follow up visit to [facility] [place] [facility] Sports Medicine and Interventional Spine Practice.    DEEPTI RDZ presents with the chief complaint as above.    Interval Hx on Jun 07, 2024: presents for follow up. Since last visit, they underwent further diagnostic workup including additional imaging studies. Patient informed of all relevant imaging findings, all questions were answered incluiding MRI of the left shoulder at [facility]. Patient reports increasing frequency of severe pain as well as increasingly severe intensity of pain. Patient reports having difficulty with ambulation indoors due to pain and stiffness from the left shoulder. Patient reports difficulty with everyday activities including reaching down/bending forward for lower body dressing, toileting, hygiene, and transferring about surfaces within her home for sitting, resting - all due to their left shoulder pain.  There have been no significant changes to their aggravating or alleviating factors since the last visit. Since the last visit, they relate significant improvements in pain previously associated with known exacerbating, aggravating factors and situations. Pharmacologic treatments now include OTC analgesics PRN, otherwise pharmacologic treatments are minimal. Given dearth of symptoms, pharmacologic treatments are now minimal. Denies new or worsened numbness, tingling, or focal motor deficit. Denies interval fall, accident, or injury. Denies change in bowel or bladder functioning.   Interval Hx on May 10, 2024: presents for follow up. At the last visit, patient was provided with a left AC joint corticosteroid injection. patient had substantial pain relief for a few weeks, patient shares that they were unable to work for one month due to pain. patient shares minimal PT since the last visit as well as viral illness for the past two weeks. patient also having marked elevation in blood sugar. recent trends between 200-300s preclude further local treatment with corticosteroids. Patient reports increasing frequency of severe pain as well as increasingly severe intensity of pain. Patient reports having difficulty with ambulation indoors due to pain and stiffness from the left shoulder. Patient reports difficulty with everyday activities including reaching down/bending forward for lower body dressing, toileting, hygiene, and transferring about surfaces within her home. There have been no significant changes to their aggravating or alleviating factors since the last visit. Pharmacologic treatments now include OTC analgesics PRN, but otherwise pharmacologic treatments are minimal. Denies new or worsened numbness, tingling, or focal motor deficit. Denies interval fall, accident, or injury. Denies change in bowel or bladder functioning.    Meds: denies regular PO pain medications Therapy Program: no recent structured targeted therapy program HEP: doing HEP regularly  Assoc Sx: Denies numbness, Tingling Denies Focal motor weakness in the upper or lower limbs Denies New or worsened bowel or bladder incontinence Denies Saddle anesthesia Denies Buckling Denies Using Orthotic(s)/Supportive devices Denies Swelling in the upper/lower extremities Denies Clicking They also deny frequent tripping, falling  ROS: A 14 point review of systems was completed. Positive findings are pain as described above. The remaining systems negative.  COVID HX: reviewed  Interval Hx on Mar 26, 2024: presents for follow up. Since last visit, her left shoulder pain persists. patient has been attending PT at [address] [place] [address] PT. patient has attended 6 sessions of PT for the shoulder. patient believes the therapy is not helping significantly. patient has been doing home exercises. pain is rated now at 8-9/10, more than previous visit. patient is not taking antibiotics at this time. patient had been trying oral NSAIDs including meloxicam which helped temporarily. There have been no significant changes to their aggravating or alleviating factors since the last visit. Pharmacologic treatments now include OTC analgesics PRN, but otherwise pharmacologic treatments are minimal. Denies new or worsened numbness, tingling, or focal motor deficit. Denies interval fall, accident, or injury. patient having minimal elbow pain since PT and oral NSAIDs.   Interval Hx on Mar 05, 2024: presents for follow up. Since last visit, they developed left shoulder pain including pain with reaching, overhead movements, and at times without exertional related pain. patient cannot cite any inciting even, patient has alleviated her pain with activity modification, missing work, and rare OTC analgesics. pain started three months ago, seems to be partly related to their work activities. Since the last visit, prior to onset of the LEFT shoulder pain, they relate improvements in B/L knee and RIGHT pain previously associated with known exacerbating, aggravating factors and situations. Pharmacologic treatments now include OTC analgesics PRN, but otherwise pharmacologic treatments are minimal. Denies new or worsened numbness, tingling, or focal motor deficit. Denies interval fall, accident, or injury. Denies change in bowel or bladder functioning.  Interval Hx on May 08, 2023: presents for follow up. Since last visit, patient has had about 5 sessions of PT, [facility], focusing mostly on the legs. patient has been doing HEP regularly. patient has been interested in developing further HEP. Since the last visit, they relate improvements in pain previously associated with known exacerbating, aggravating factors and situations. Pharmacologic treatments now include OTC analgesics PRN, but otherwise pharmacologic treatments are minimal. Denies new or worsened numbness, tingling, or focal motor deficit. Denies interval fall, accident, or injury. Denies change in bowel or bladder functioning. 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 Patient believes their right shoulder pain is worsening. Patient has felt improved relief with injection treatment in the past but has not tried gel injections  Interval Hx on Mar 17, 2023: presents for follow up. Since last visit, they underwent radiographs of the right shoulder. Patient reports pain relief for a few days then less but consistent pain in the right knee. Shoulder pain is worse since the last visit. Since the last visit, they relate improvements in pain previously associated with known exacerbating, aggravating factors and situations. Pain in shoulder has been happening for years, recently worsening. patient has difficulty doing their activities related to their job. Pharmacologic treatments now include OTC analgesics PRN, but otherwise pharmacologic treatments are minimal. Denies new or worsened numbness, tingling, or focal motor deficit. Denies interval fall, accident, or injury. Denies change in bowel or bladder functioning.  Interval Hx on Mar 07, 2023: presents for follow up.  [id number] Oli. Since last visit, they continue to have right > left knee pain, worse with standing, worse with stair climbing, patient has relief with taking breaks. Patient started PT since their last visit, patient has had >4 sessions at [address] [place] [address] in [place], [place]; also having relief with medication and PT treatments.  There have been no significant changes to their aggravating or alleviating factors since the last visit. Pharmacologic treatments now include OTC analgesics PRN, but otherwise pharmacologic treatments are minimal. Denies new or worsened numbness, tingling, or focal motor deficit. Denies interval fall, accident, or injury. Patient relates that her AM blood glucose has been better controlled 130-135 most of the time recently in the AM, taking insulin daily but does not check her levels.  Patient reports increasing frequency of severe pain as well as increasingly severe intensity of pain. Patient reports having difficulty with ambulation indoors due to pain and stiffness from the right knee. Patient reports difficulty with everyday activities including reaching down/bending forward for lower body dressing, toileting, hygiene, and transferring about surfaces within her home for sitting, resting.  Initial Hx on 02/13/2023: Presents in person to [facility]. patient reports no injury or inciting event for the right knee. patient reports worsening pain over the past several months, now has difficulty with their job due to the knee pain. patient has been to their PCP, using recommended ointment to the right knee. pain occurs over the anterior right knee over the medial joint line. The patient's difficulties began 6 months ago. The pain is graded as moderate to severe. The pain is described as sharp. The pain is intermittent. The pain does not radiate. The patient feels that the pain is overall persistent. Patient denies other recent fall, MVA, injury, trauma, or accident besides presenting history above. Patient suffers from diabetes, usually 200-250 in the morning. Aggravating: ambulation, prolonged sitting, prolonged standing, navigating stairs, sit to stand transitional movements, getting out of bed Alleviating: rest, activity modification, avoiding prolonged walking, pharmacologic treatments  Assoc Hx: Ambulates without assistive device Injection Hx: denies locally directed treatment to the area in question Imaging Hx: reviewed  Level of functioning: indep with ambulation, indep with ADLs Living Situation: dwelling with steps to enter

## 2024-06-07 NOTE — ASSESSMENT
[FreeTextEntry1] :                                                       Assessment/Plan:  DEEPTI RDZ is a 43 year female with acute on chronic right knee pain here for follow up visit now with LEFT shoulder pain  AC Joint Osteoarthritis, right > LEFT  Subacromial bursitis, right Impingement syndrome, right Scapular dyskinesis, right Postural imbalance  Subacromial bursitis, left Tendinopathy of rotator cuff, left Decreased range of motion of shoulder, left  Osteoarthritis of knee, right Patellofemoral arthritis, right Patellofemoral pain syndrome, right Internal derangement of meniscus, right Decreased range of motion of knee, right Weakness of the hip, right  - 06/07/2024 tolerated right SASDB steroid injection well in office without complications - 03/26/2024 tolerated left ACJ steroid injection well in office without complications - Tiers of treatment and management of above diagnosis(es) were discussed with patient - Optimal diet, weight, sleep, and lifestyle management to minimize stress and maximize well being counseling provided - Imaging reviewed and discussed with patient - Reviewed previous encounter notes from 2/7/2022 Dr. JEFFREY Farris ([facility]) - Mar 05, 2024: Patient was advised to resume a structured, targeted therapy program 2-3x/wk for 8 wks with goal toward HEP for the LEFT SHOULDER; .new Rx for the right shoulder Jun 07, 2024   - Patient was educated on an appropriate home exercise program, provided with exercise recommendations, all questions answered - Regarding follow up on March 17, 2023: Patient rude to staff, left without allowing for the ultrasound of right shoulder, patient presented hours prior to her visit and as a courtesy writer had her roomed and exam started, patient was offered food on multiple occasions by staff - May 10, 2024 RESUME MELOXICAM: Patient was advised to start Meloxicam 15 mg daily with food/milk for 5-7 days to help with pain and to decrease inflammation, afterwards as needed. Rx sent with famotidine and written instructions - Patient may trial topical compound cream to the right knee/right shoulder no more than twice per day as needed for next 2-3 weeks, Rx entered via portal again today 5/8/2023, written information provided to the patient in addition to verbal explanation regarding the medication - Patient was advised to apply cool compresses or warm heat to affected regions PRN - Jun 07, 2024; provided with referral to (Orthopedic Surgery Shoulder Service for consultation  - Jun 07, 2024: Given that the pt has not improved with has not improved with tylenol, ibuprofen, naproxen, NSAIDs, muscle relaxants and physical therapy/home exercise program>6 weeks, ultrasound guided right knee viscosupplementation with orthovisc or similar, weekly series for three weeks was recommended. The purpose, risks, benefits, alternatives were discussed. Risks, benefits alternatives discussed with patient including bleeding, infection, worsening pain, nerve damage, and the patient elected to proceed with the procedure.  They verbalized understanding. provided with application Jun 07, 2024. Will schedule once authorization obtained  - Educated about red flag symptoms including (but not limited to) new, worsened, or persistent: fever greater than 100F, bowel or bladder incontinence, bowel obstipation, inability to void urine, urinary leakage, Severe nausea or vomiting, Worsening numbness, worsening tingling/paresthesias, and/or new or progressive motor weakness; advised to seek immediate medical attention at his nearest Emergency department should they experience any of the above  - Follow up in 2 months after specialist consultation for the shoulder, sooner if right knee gel injections are authorized   I have personally spent a total of at least 45 minutes preparing, reviewing internal and external records, explaining, counseling, and coordinating care for this patient encounter.  Thank you, ZURI FERMIN, for allowing me to participate in the care of your patient. Please do not hesitate to contact me with questions/concerns.  Salty Swift M.D. Sports and Interventional Spine Department of Physical Medicine and Rehabilitation  [facility] [address] [place] [address]  Appointments: [phone] Fax: [phone]

## 2024-06-07 NOTE — DATA REVIEWED
[Plain X-Rays] : plain X-Rays [FreeTextEntry1] : MAGNETIC RESONANCE IMAGING OF THE LEFT SHOULDER TECHNIQUE: Multiplanar, multisequential MRI was performed in the 25-degree tilt position. HISTORY: Patient complains of left shoulder pain. INTERPRETATION: AC joint hypertrophy. No separation. No lateral sloping of the acromion. No inferior curvature. No narrowing of the supraspinatus outlet. No narrowing of the humeral-acromial interval. Infraspinatus tendinopathy. 2 mm traction cyst at the humeral head insertion with no fracture. No muscle atrophy or tear. Supraspinatus tendinopathy and fraying with moderate grade ill-defined articular and interstitial tear at and proximal to the insertion measuring approximately 10 mm in length. No muscle atrophy or tear. Atrophy of the teres minor. Biceps is intact in the groove. Tendinopathy extends to the anchor. Tenosynovitis. Subscapularis tendinopathy and fraying with low-grade insertional tear. No muscle atrophy or tear. Posterior inferior labrum is intact. Anterior inferior labrum fraying and tear. Superior labrum fraying and tear. No fracture, dislocation or erosion. Mild narrowing of the glenohumeral joint with trace effusion. IMPRESSION: AC joint hypertrophy. Supraspinatus tendinopathy and fraying with ill-defined moderate grade articular and interstitial tear at and proximal to the insertion. No muscle atrophy. Subscapularis tendinopathy and fraying with low-grade insertional tear. No muscle atrophy. Fraying and tear of the superior labrum and anterior inferior labrum. Biceps tendinopathy and tenosynovitis. Capsular thickening more noted anteriorly which can be seen with adhesive capsulitis. Glenohumeral joint effusion.   Mar 05, 2024:  X-rays of the LEFT (AP, Grashey, scapular-Y, and axillary views) demonstrate:  minimal joint space narrowing, minimal AC joint OA, neg fracture or dislocation  Mar 05, 2024  XR of the B/L clavicles obtained including AP and Zanca views on each side: no ACJ separation, left AC joint OA  Weight bearing x-rays of the RIGHT knees (AP, flexion weight bearing, lateral, and merchant views) demonstrate no significant joint space narrowing except in the patellofemoral compartment, neg fracture or dislocation, +narrowing in the patellofemoral joint with lateral patellar tilt.

## 2024-08-09 ENCOUNTER — APPOINTMENT (OUTPATIENT)
Dept: PHYSICAL MEDICINE AND REHAB | Facility: CLINIC | Age: 44
End: 2024-08-09

## 2024-08-16 ENCOUNTER — APPOINTMENT (OUTPATIENT)
Dept: ORTHOPEDIC SURGERY | Facility: CLINIC | Age: 44
End: 2024-08-16
Payer: MEDICAID

## 2024-08-16 VITALS — DIASTOLIC BLOOD PRESSURE: 84 MMHG | SYSTOLIC BLOOD PRESSURE: 122 MMHG | OXYGEN SATURATION: 98 % | HEART RATE: 81 BPM

## 2024-08-16 DIAGNOSIS — M67.912 UNSPECIFIED DISORDER OF SYNOVIUM AND TENDON, LEFT SHOULDER: ICD-10-CM

## 2024-08-16 DIAGNOSIS — Z86.39 PERSONAL HISTORY OF OTHER ENDOCRINE, NUTRITIONAL AND METABOLIC DISEASE: ICD-10-CM

## 2024-08-16 DIAGNOSIS — M19.012 PRIMARY OSTEOARTHRITIS, LEFT SHOULDER: ICD-10-CM

## 2024-08-16 DIAGNOSIS — S43.432S SUPERIOR GLENOID LABRUM LESION OF LEFT SHOULDER, SEQUELA: ICD-10-CM

## 2024-08-16 PROCEDURE — 99204 OFFICE O/P NEW MOD 45 MIN: CPT

## 2024-08-16 RX ORDER — DULOXETINE HYDROCHLORIDE 30 MG/1
30 CAPSULE, DELAYED RELEASE PELLETS ORAL
Qty: 60 | Refills: 3 | Status: ACTIVE | COMMUNITY
Start: 2024-08-16 | End: 1900-01-01

## 2024-08-16 RX ORDER — METHYLPREDNISOLONE 4 MG/1
4 TABLET ORAL
Qty: 1 | Refills: 0 | Status: ACTIVE | COMMUNITY
Start: 2024-08-16 | End: 1900-01-01

## 2024-08-18 NOTE — HISTORY OF PRESENT ILLNESS
Noble Ribeiro  :  1964  MRN:  [id number]    Admit date:  3/6/2020  Discharge date:      Admitting Physician:  Zulay Isaacs DO    Advance Directive: Full Code    Consults: GI and hematology/oncology    Primary Care Physician:  Edy Valentine MD    Discharge Diagnoses:  Principal Problem:    Upper GI bleed  Active Problems:    Malignant neoplasm of pancreas ([address]    Anemia associated with chemotherapy    Chemotherapy-induced thrombocytopenia    Neoplastic malignant related fatigue    Chemotherapy-induced peripheral neuropathy ([facility] [address]    Severe protein-calorie malnutrition ([address]    Pancytopenia ([facility] [address]  Resolved Problems:    * No resolved hospital problems. *      Significant Diagnostic Studies:   No results found. Pertinent Labs:   CBC:   Recent Labs     20  1500  20  1323 20  0423 20  0535   WBC 6.4  --   --  3.5* 3.3*   HGB 8.0*   < > 10.5* 10.0* 9.9*     --   --  87* 91*    < > = values in this interval not displayed. BMP:    Recent Labs     20  1500      K 4.2      CO2 23   BUN 9   CREATININE 0.7   GLUCOSE 171*     INR: No results for input(s): INR in the last 72 hours. ABGs:No results for input(s): PH, PO2, PCO2, HCO3, BE, O2SAT in the last 72 hours. Lactic Acid:No results for input(s): LACTA in the last 72 hours. Procedures: The patient underwent esophagogastroduodenoscopy on 2020, showing some erosion along the Whipple procedure anastomosis line, improved from most recent endoscopy. Hospital Course:   3-6: Patient with history of metastatic pancreatic adenocarcinoma treated with Whipple procedure and ongoing chemotherapy, with history of bleeding from anastomosis site presents to ED with presyncope and weakness followed by vomiting of bloody fluids. Hemoglobin initially checked at 8 g with stable VS. Admitted to ICU on pantoprazole drip. On recheck overnight, hemoglobin had dropped to 5.2 g.  3-7: 3 units PRBCs transfused overnight.  GI consulted, agrees to EGD 3-9. Oncology consulted, will reschedule chemotherapy treatment from 3-9 to 3-11. Transferred to med/surg. 3-8: Hemoglobin 10 g this morning. Patient is feeling well, at her baseline. EGD tomorrow, remains on pantoprazole drip. 3-9: Hemoglobin stable, no bleeding at anastomosis site with some erosion but improved from previous endoscopy. GI has cleared for discharge on PPI and sucralfate. She will need to be set up with GI clinic for capsule endoscopy to evaluate the small intestine for other sources of bleeding. Discharged to home.     Physical Exam:  Vitals: /66   Pulse (!) 41   Temp 97.9 °F (36.6 °C) (Temporal)   Resp 14   Ht 5' 7\" (1.702 m)   Wt 112 lb (50.8 kg)   SpO2 94%   BMI 17.54 kg/m²   24HR INTAKE/OUTPUT:      Intake/Output Summary (Last 24 hours) at 3/9/2020 1550  Last data filed at 3/9/2020 1524  Gross per 24 hour   Intake 4084.95 ml   Output 300 ml   Net 3784.95 ml     General appearance: alert and cooperative with exam  HEENT: atraumatic, eyes with clear conjunctiva and normal lids, pupils and irises normal, external ears and nose are normal, lips normal. Neck without masses, lympadenopathy, bruit, thyroid normal  Lungs: no increased work of breathing, clear to auscultation bilaterally without rales, rhonchi or wheezes  Heart: regular rate and rhythm, S1, S2 normal, no murmur, click, rub or gallop  Abdomen: soft, non-tender; bowel sounds normal; no masses,  no organomegaly  Extremities: extremities normal without clubbing, atraumatic, no cyanosis or edema  Neurologic: no focal neurologic deficits, normal sensation, alert and oriented, affect and mood appropriate  Skin: no jaundice, rashes, or nodules    Discharge Medications:       Chin Neville   Home Medication Instructions [facility]:[id number]    Printed on:03/09/20 1550   Medication Information                      cyanocobalamin 1000 MCG tablet  Take 1,000 mcg by mouth daily             lidocaine-prilocaine (EMLA) 2.5-2.5 % cream  Apply to port area and cover with plastic wrap one hour prior to use. lipase-protease-amylase (ZENPEP) 5000 units delayed release capsule  Take 1 capsule by mouth 4 times daily (with meals and nightly) Take with meals and snacks for a total of 8 daily             lipase-protease-amylase (ZENPEP) 5000-78442 units CPEP delayed release capsule  Take 1 capsule by mouth 4 times daily (with meals and nightly)             Multiple Vitamins-Minerals (THERAPEUTIC MULTIVITAMIN-MINERALS) tablet  Take 1 tablet by mouth daily             NONFORMULARY  daily Tumeric otc             ondansetron (ZOFRAN) 8 MG tablet  Take 1 tablet by mouth every 8 hours as needed for Nausea or Vomiting             pantoprazole (PROTONIX) 40 MG tablet  Take 1 tablet by mouth 2 times daily             polyethylene glycol (GLYCOLAX) powder  Take 17 g by mouth daily as needed             promethazine (PHENERGAN) 25 MG tablet  Take 25 mg by mouth every 6 hours as needed for Nausea             sucralfate (CARAFATE) 1 GM tablet  Take 1 tablet by mouth 4 times daily             vitamin E 400 UNIT capsule  Take 400 Units by mouth daily                 Discharge Instructions: Follow up with Paulette Villatoro MD in 7 days. Take medications as directed. Resume activity as tolerated. Diet: Diet NPO Time Specified Exceptions are: Ice Chips     Disposition: Patient is medically stable and will be discharged Home. Time spent on discharge less than 30 minutes.     Signed:  Andres Persaud DO [de-identified] : DEEPTI RDZ is a 43-year-old female presents today for initial visit of left shoulder pain. RHD. She reports that the pain is so intense that it impedes her ability to walk. Pain started 6 months ago.  She is unable to do anything with the left shoulder. She works in [employer].  She is e experiencing shooting and sharp pain.She need surgery. She attempted to schedule an appointment with a shoulder surgeon but was unable to secure one. She attempted to schedule an appointment with a shoulder surgeon but was unable to secure one. he patient has not been taking any medications, performing exercises, or attending physical therapy. She saw Dr. Swift in the past and was given left shoulder CSI in 05/02024 which did not help. . An MRI revealed a gap in the shoulder that requires surgical intervention for improvement. She did PT in the past.  Confirms numbness or tingling.  She denies any clicking, she is unable to sleep on the side. Pain is shoulder joint.

## 2024-08-18 NOTE — HISTORY OF PRESENT ILLNESS
[de-identified] : DEEPTI RDZ is a 43-year-old female presents today for initial visit of left shoulder pain. RHD. She reports that the pain is so intense that it impedes her ability to walk. Pain started 6 months ago.  She is unable to do anything with the left shoulder. She works in [employer].  She is e experiencing shooting and sharp pain.She need surgery. She attempted to schedule an appointment with a shoulder surgeon but was unable to secure one. She attempted to schedule an appointment with a shoulder surgeon but was unable to secure one. he patient has not been taking any medications, performing exercises, or attending physical therapy. She saw Dr. Swift in the past and was given left shoulder CSI in 05/02024 which did not help. . An MRI revealed a gap in the shoulder that requires surgical intervention for improvement. She did PT in the past.  Confirms numbness or tingling.  She denies any clicking, she is unable to sleep on the side. Pain is shoulder joint.

## 2024-08-18 NOTE — REASON FOR VISIT
[Initial Visit] : an initial visit for [[employer] ] : provided by [employer]   [FreeTextEntry2] : left shoulder [Interpreters_IDNumber] : [id number] [Interpreters_FullName] : Digna [TWNoteComboBox1] : Oc

## 2024-08-18 NOTE — ASSESSMENT
[FreeTextEntry1] : DEEPTI RDZ is a 43-year-old female with left shoulder pain. I discussed with the patient that their symptoms, signs, and imaging are most consistent with rotator cuff tendinopathy, subacromial bursitis, labral tear, and AC joint arthrosis. We reviewed the natural history of this condition and treatment options ranging from conservative measures (activity modification, physical therapy, icing, oral anti-inflammatory and/or analgesic medications, steroid injection) to surgical management. We agreed on the following plan:   X-ray and MRI reviewed with patient today. Activity modification Start Home Exercises for shoulder stretching and strengthening (Neer protocol). Demonstration, resistance band and handout provided. Start PT. Referral provided. Referral provided for shoulder surgeon. Medication: Medrol dose pack and duloxetine 30 mg HS to be increased to 60 mg HS. Consider suprascapular nerve block if no symptomatic improvement Follow up in 6-8 weeks.

## 2024-08-18 NOTE — END OF VISIT
[FreeTextEntry3] : Documented by Lisa Mcadams acting as a scribe for Mirella Iglesias on 08/16/2024   All medical record entries made by the Scribe were at my, Dr. Mirella Iglesias direction and personally dictated by me on 08/16/2024 . I have reviewed the chart and agree that the record accurately reflects my personal performance of the history, physical exam, assessment and plan. I have also personally directed, reviewed, and agreed with the chart. [Time Spent: ___ minutes] : I have spent [unfilled] minutes of time on the encounter.

## 2024-08-18 NOTE — DISCUSSION/SUMMARY

## 2024-08-18 NOTE — ADDENDUM
[FreeTextEntry1] : I, Lisa Mcadams ([facility]) assisted in filling out this chart under the dictation of Mirella Iglesias on 08/16/2024 .

## 2024-08-18 NOTE — REASON FOR VISIT
[Initial Visit] : an initial visit for [[employer] ] : provided by [employer]   [FreeTextEntry2] : left shoulder [Interpreters_IDNumber] : [id number] [Interpreters_FullName] : Digna [TWNoteComboBox1] : Oc

## 2024-08-18 NOTE — PHYSICAL EXAM
[de-identified] : General: Well-nourished, well-developed, alert, and in no acute distress. Head: Normocephalic.  Eyes: Pupils equal, extraocular muscles intact, normal sclera.  Nose: No nasal discharge.  Cardiovascular: Extremities are warm and well perfused. Distal pulses are symmetric bilaterally.  Respiratory: No labored breathing.  Extremities: Sensation is intact distally bilaterally. Distal pulses are symmetric bilaterally  Lymphatic: No regional lymphadenopathy, no lymphedema  Neurologic: No focal deficits  Skin: Normal skin color, texture, and turgor  Psychiatric: Normal affect MSK:   C-spine demonstrates normal lordosis, no tenderness to palpation midline, paraspinals, AROM full and pain free Cervical facet loading negative Spurling's Compression negative   Examination of left shoulder shows no overlying skin changes or muscular atrophy. There is tenderness to light palpation over the AC joint, Bicipital groove, upper trapezius and deltoid muscle.   Range of motion shows forward elevation of [140], abduction [120], external rotation [40], and internal rotation to the gluteal area. There is pain at the end range of motion.   Special Tests: Painful Arc [positive] Neer's [positive] Hawkin's [positive] Laura's positive Resisted ext rotation positive Lift-Off [positive] Woodson positive Speed's positive   Examination of right shoulder shows no overlying skin changes or muscular atrophy. There is no tenderness to palpation over the AC joint, Bicipital groove, Trapezius   Range of motion shows forward elevation of [160], abduction [160], external rotation [60], and internal rotation to the [lumbar spine].  There is no pain at the end range of motion.   Special Tests: Painful Arc negative Neer's negative Hawkin's negative Laura's negative Resisted ext rotation negative Lift-Off negative Woodson negative Speed's negative   Sensation is intact to light touch over the axillary, musculocutaneous, median, radial, and ulnar nerve distributions bilaterally. Capillary refill is less than two seconds. Radial pulses 2+ equal bilaterally. Strength testing shows 5/5 abduction, 5/5 external rotation, 5/5 internal rotation, 5/5 biceps, 5/5 triceps. 5/5 wrist extension, 5/5 intrinsics. Reflexes 2+ biceps, brachioradialis. Almonte negative.  [de-identified] : X-ray Left Shoulder (03/05/24): No evidence of fracture or dislocation. Joint spaces are preserved.  MRI left shoulder obtained at [facility] MRI was reviewed with the patient demonstrating: (report scanned in).  -AC joint hypertrophy. -Supraspinatus tendinopathy and fraying with ill-defined moderate-grade articular and interstitial tear at end proximal to the insertion. No muscle atrophy. - Subscapularis tendinopathy and fraying with low-grade insertional tear. No muscle atrophy. - Fraying and tear of the superior labrum and anterior inferior labrum. - Biceps tendinopathy and tenosynovitis. - Capsular thickening, more noted anteriorly, which can be seen with adhesive capsulitis. - Glenohumeral joint effusion.

## 2024-08-18 NOTE — ADDENDUM
[FreeTextEntry1] : I, Lisa Mcadams ([facility]) assisted in filling out this chart under the dictation of Mirella Iglesias on 08/16/2024 .

## 2024-08-18 NOTE — PHYSICAL EXAM
[de-identified] : General: Well-nourished, well-developed, alert, and in no acute distress. Head: Normocephalic.  Eyes: Pupils equal, extraocular muscles intact, normal sclera.  Nose: No nasal discharge.  Cardiovascular: Extremities are warm and well perfused. Distal pulses are symmetric bilaterally.  Respiratory: No labored breathing.  Extremities: Sensation is intact distally bilaterally. Distal pulses are symmetric bilaterally  Lymphatic: No regional lymphadenopathy, no lymphedema  Neurologic: No focal deficits  Skin: Normal skin color, texture, and turgor  Psychiatric: Normal affect MSK:   C-spine demonstrates normal lordosis, no tenderness to palpation midline, paraspinals, AROM full and pain free Cervical facet loading negative Spurling's Compression negative   Examination of left shoulder shows no overlying skin changes or muscular atrophy. There is tenderness to light palpation over the AC joint, Bicipital groove, upper trapezius and deltoid muscle.   Range of motion shows forward elevation of [140], abduction [120], external rotation [40], and internal rotation to the gluteal area. There is pain at the end range of motion.   Special Tests: Painful Arc [positive] Neer's [positive] Hawkin's [positive] Laura's positive Resisted ext rotation positive Lift-Off [positive] Cecil positive Speed's positive   Examination of right shoulder shows no overlying skin changes or muscular atrophy. There is no tenderness to palpation over the AC joint, Bicipital groove, Trapezius   Range of motion shows forward elevation of [160], abduction [160], external rotation [60], and internal rotation to the [lumbar spine].  There is no pain at the end range of motion.   Special Tests: Painful Arc negative Neer's negative Hawkin's negative Laura's negative Resisted ext rotation negative Lift-Off negative Cecil negative Speed's negative   Sensation is intact to light touch over the axillary, musculocutaneous, median, radial, and ulnar nerve distributions bilaterally. Capillary refill is less than two seconds. Radial pulses 2+ equal bilaterally. Strength testing shows 5/5 abduction, 5/5 external rotation, 5/5 internal rotation, 5/5 biceps, 5/5 triceps. 5/5 wrist extension, 5/5 intrinsics. Reflexes 2+ biceps, brachioradialis. Almonte negative.  [de-identified] : X-ray Left Shoulder (03/05/24): No evidence of fracture or dislocation. Joint spaces are preserved.  MRI left shoulder obtained at Stand-up MRI was reviewed with the patient demonstrating: (report scanned in).  -AC joint hypertrophy. -Supraspinatus tendinopathy and fraying with ill-defined moderate-grade articular and interstitial tear at end proximal to the insertion. No muscle atrophy. - Subscapularis tendinopathy and fraying with low-grade insertional tear. No muscle atrophy. - Fraying and tear of the superior labrum and anterior inferior labrum. - Biceps tendinopathy and tenosynovitis. - Capsular thickening, more noted anteriorly, which can be seen with adhesive capsulitis. - Glenohumeral joint effusion.

## 2024-08-18 NOTE — DISCUSSION/SUMMARY

## 2024-09-30 NOTE — REASON FOR VISIT
We received a fax refill request for [facility].  Please escribe Folic Acid Tabs to their pharmacy.  The pharmacy is correct in the chart and they are requesting a 90 day supply.  Refills: 3   [Initial Evaluation] : an initial evaluation

## 2024-10-04 ENCOUNTER — APPOINTMENT (OUTPATIENT)
Dept: ORTHOPEDIC SURGERY | Facility: CLINIC | Age: 44
End: 2024-10-04
Payer: MEDICAID

## 2024-10-04 VITALS — SYSTOLIC BLOOD PRESSURE: 113 MMHG | HEART RATE: 93 BPM | OXYGEN SATURATION: 100 % | DIASTOLIC BLOOD PRESSURE: 79 MMHG

## 2024-10-04 DIAGNOSIS — M67.912 UNSPECIFIED DISORDER OF SYNOVIUM AND TENDON, LEFT SHOULDER: ICD-10-CM

## 2024-10-04 DIAGNOSIS — M75.52 BURSITIS OF LEFT SHOULDER: ICD-10-CM

## 2024-10-04 DIAGNOSIS — S43.432S SUPERIOR GLENOID LABRUM LESION OF LEFT SHOULDER, SEQUELA: ICD-10-CM

## 2024-10-04 PROCEDURE — 99214 OFFICE O/P EST MOD 30 MIN: CPT | Mod: 25

## 2024-10-04 PROCEDURE — 20611 DRAIN/INJ JOINT/BURSA W/US: CPT | Mod: LT

## 2024-10-06 NOTE — REASON FOR VISIT
[Follow-Up Visit] : a follow-up visit for [[employer] ] : provided by [employer]   [FreeTextEntry2] : left shoulder pain [Interpreters_IDNumber] : [id number] [Interpreters_FullName] : Jaciel [TWNoteComboBox1] : Oc

## 2024-10-06 NOTE — PROCEDURE
[de-identified] :  Ultrasound-Guided Injection of left Intra-articular Glenohumeral Joint:   Following a discussion of the risks (bleeding, infection) and benefits, verbal consent was obtained. With patient seated in a chair and arm at side, the posterior aspect of the humeral head at the level of the glenoid was identified under ultrasound with Sonosite 15 MHz transducer. The posterior aspect of the shoulder was anaesthetiised with ethyl chloride spray and prepped with chlorhexadine. Under strict sterile technique a 22 G 1.5 inch needle was inserted into the glenohumeral joint capsule under US guidance using inferior approach (short axis view). After negative aspiration a mixture of 1 mL of 40mg/mL of Triamcinolone, 2 mL of 1% Lidocaine and 4 mL 0.5% Bupivacaine was injected without resistance. Needle location was confirmed on ultrasound.   The use of direct ultrasound visualization was necessary to increase patient safety by identifying and avoiding inadvertent needle placement within the neurovascular and osteochondral structures. Additionally, the increased accuracy of needle placement may improve therapeutic efficacy and allow higher diagnostic specificity when evaluating the effectiveness of this injection.   The patient tolerated the procedure well and post injection instructions provided (no strenuous activity for 48 hrs and ice the area). Patient verbalized understanding.

## 2024-10-06 NOTE — DISCUSSION/SUMMARY

## 2024-10-06 NOTE — DISCUSSION/SUMMARY

## 2024-10-06 NOTE — PHYSICAL EXAM
[de-identified] : General: Well-nourished, well-developed, alert, and in no acute distress. Head: Normocephalic. Eyes: Pupils equal, extraocular muscles intact, normal sclera. Nose: No nasal discharge. Cardiovascular: Extremities are warm and well perfused. Distal pulses are symmetric bilaterally. Respiratory: No labored breathing. Extremities: Sensation is intact distally bilaterally. Distal pulses are symmetric bilaterally Lymphatic: No regional lymphadenopathy, no lymphedema Neurologic: No focal deficits Skin: Normal skin color, texture, and turgor Psychiatric: Normal affect  Examination of left shoulder shows no overlying skin changes or muscular atrophy. There is tenderness to light palpation over the AC joint, Bicipital groove, upper trapezius and deltoid muscle.  Range of motion shows forward elevation of [100], abduction [90], external rotation [40], and internal rotation to the gluteal area. There is pain at the end range of motion.  Special Tests: Painful Arc [positive] Neer's [positive] Hawkin's [positive] Laura's positive Resisted ext rotation positive Lift-Off [positive] [place] positive Speed's positive

## 2024-10-06 NOTE — DISCUSSION/SUMMARY

## 2024-10-06 NOTE — ADDENDUM
[FreeTextEntry1] : I, Lisa Mcadams ([facility]) assisted in filling out this chart under the dictation of Mirella Iglesias on 10/04/2024 .

## 2024-10-06 NOTE — PHYSICAL EXAM
[de-identified] : General: Well-nourished, well-developed, alert, and in no acute distress. Head: Normocephalic. Eyes: Pupils equal, extraocular muscles intact, normal sclera. Nose: No nasal discharge. Cardiovascular: Extremities are warm and well perfused. Distal pulses are symmetric bilaterally. Respiratory: No labored breathing. Extremities: Sensation is intact distally bilaterally. Distal pulses are symmetric bilaterally Lymphatic: No regional lymphadenopathy, no lymphedema Neurologic: No focal deficits Skin: Normal skin color, texture, and turgor Psychiatric: Normal affect  Examination of left shoulder shows no overlying skin changes or muscular atrophy. There is tenderness to light palpation over the AC joint, Bicipital groove, upper trapezius and deltoid muscle.  Range of motion shows forward elevation of [100], abduction [90], external rotation [40], and internal rotation to the gluteal area. There is pain at the end range of motion.  Special Tests: Painful Arc [positive] Neer's [positive] Hawkin's [positive] Laura's positive Resisted ext rotation positive Lift-Off [positive] [place] positive Speed's positive

## 2024-10-06 NOTE — REASON FOR VISIT
[Follow-Up Visit] : a follow-up visit for [[employer] ] : provided by [employer]   [FreeTextEntry2] : left shoulder pain [Interpreters_IDNumber] : [id number] [Interpreters_FullName] : Jaciel [TWNoteComboBox1] : Oc

## 2024-10-06 NOTE — HISTORY OF PRESENT ILLNESS
[de-identified] : DEEPTI RDZ is a 43-year-old female who presents for a follow-up for left shoulder pain. Last visit was on 08/16/2024. She is still experiencing sharp pain. Pain persisted for three weeks, preventing work. The patient has been undergoing therapy for the 3 weeks. No current medications are being taken for pain, despite a history of medication use for 18 and 21 months. The patient reports a little numbness or tingling in the arm. The previous injection with Dr. Hansen in May was ineffective. The patient is considering a different injection today, with potential surgical consultation if no improvement is noted. No recent vaccines within last 14 days.

## 2024-10-06 NOTE — REASON FOR VISIT
[Follow-Up Visit] : a follow-up visit for [[employer] ] : provided by [employer]   [FreeTextEntry2] : left shoulder pain [Interpreters_IDNumber] : [id number] [Interpreters_FullName] : Jaciel [TWNoteComboBox1] : Oc

## 2024-10-06 NOTE — PHYSICAL EXAM
[de-identified] : General: Well-nourished, well-developed, alert, and in no acute distress. Head: Normocephalic. Eyes: Pupils equal, extraocular muscles intact, normal sclera. Nose: No nasal discharge. Cardiovascular: Extremities are warm and well perfused. Distal pulses are symmetric bilaterally. Respiratory: No labored breathing. Extremities: Sensation is intact distally bilaterally. Distal pulses are symmetric bilaterally Lymphatic: No regional lymphadenopathy, no lymphedema Neurologic: No focal deficits Skin: Normal skin color, texture, and turgor Psychiatric: Normal affect  Examination of left shoulder shows no overlying skin changes or muscular atrophy. There is tenderness to light palpation over the AC joint, Bicipital groove, upper trapezius and deltoid muscle.  Range of motion shows forward elevation of [100], abduction [90], external rotation [40], and internal rotation to the gluteal area. There is pain at the end range of motion.  Special Tests: Painful Arc [positive] Neer's [positive] Hawkin's [positive] Laura's positive Resisted ext rotation positive Lift-Off [positive] Converse positive Speed's positive

## 2024-10-06 NOTE — ADDENDUM
[FreeTextEntry1] : I, Lisa Mcadams ([facility]) assisted in filling out this chart under the dictation of Mirella Iglesias on 10/04/2024 .

## 2024-10-06 NOTE — PROCEDURE
[de-identified] :  Ultrasound-Guided Injection of left Intra-articular Glenohumeral Joint:   Following a discussion of the risks (bleeding, infection) and benefits, verbal consent was obtained. With patient seated in a chair and arm at side, the posterior aspect of the humeral head at the level of the glenoid was identified under ultrasound with Sonosite 15 MHz transducer. The posterior aspect of the shoulder was anaesthetiised with ethyl chloride spray and prepped with chlorhexadine. Under strict sterile technique a 22 G 1.5 inch needle was inserted into the glenohumeral joint capsule under US guidance using inferior approach (short axis view). After negative aspiration a mixture of 1 mL of 40mg/mL of Triamcinolone, 2 mL of 1% Lidocaine and 4 mL 0.5% Bupivacaine was injected without resistance. Needle location was confirmed on ultrasound.   The use of direct ultrasound visualization was necessary to increase patient safety by identifying and avoiding inadvertent needle placement within the neurovascular and osteochondral structures. Additionally, the increased accuracy of needle placement may improve therapeutic efficacy and allow higher diagnostic specificity when evaluating the effectiveness of this injection.   The patient tolerated the procedure well and post injection instructions provided (no strenuous activity for 48 hrs and ice the area). Patient verbalized understanding.

## 2024-10-06 NOTE — ASSESSMENT
[FreeTextEntry1] : DEEPTI RDZ is a 43-year-old female with left shoulder pain. I discussed with the patient that their symptoms, signs, and imaging are most consistent with rotator cuff tendinopathy, subacromial bursitis, labral tear, and osteoarthritis.  We reviewed the natural history of this condition and treatment options. We agreed on the following plan:   US-guided CSI as detailed above. Encouraged to continue home exercises per handout. Continue physical therapy. Recommend 150 min per week of moderate intensity aerobic activity  Medication: Continue with meloxicam. Refill prescription provided.  Follow up in 3 months.

## 2024-10-06 NOTE — ADDENDUM
[FreeTextEntry1] : I, Lisa Mcadams ([facility] assisted in filling out this chart under the dictation of Mirella Iglesias on 10/04/2024 .

## 2024-10-06 NOTE — PROCEDURE
[de-identified] :  Ultrasound-Guided Injection of left Intra-articular Glenohumeral Joint:   Following a discussion of the risks (bleeding, infection) and benefits, verbal consent was obtained. With patient seated in a chair and arm at side, the posterior aspect of the humeral head at the level of the glenoid was identified under ultrasound with Sonosite 15 MHz transducer. The posterior aspect of the shoulder was anaesthetiised with ethyl chloride spray and prepped with chlorhexadine. Under strict sterile technique a 22 G 1.5 inch needle was inserted into the glenohumeral joint capsule under US guidance using inferior approach (short axis view). After negative aspiration a mixture of 1 mL of 40mg/mL of Triamcinolone, 2 mL of 1% Lidocaine and 4 mL 0.5% Bupivacaine was injected without resistance. Needle location was confirmed on ultrasound.   The use of direct ultrasound visualization was necessary to increase patient safety by identifying and avoiding inadvertent needle placement within the neurovascular and osteochondral structures. Additionally, the increased accuracy of needle placement may improve therapeutic efficacy and allow higher diagnostic specificity when evaluating the effectiveness of this injection.   The patient tolerated the procedure well and post injection instructions provided (no strenuous activity for 48 hrs and ice the area). Patient verbalized understanding.

## 2024-10-06 NOTE — END OF VISIT
[FreeTextEntry3] : Documented by Lisa Mcadams acting as a scribe for Mirella Iglesias on 10/04/2024   All medical record entries made by the Scribe were at my, Dr. Mirella Iglesias direction and personally dictated by me on 10/04/2024 . I have reviewed the chart and agree that the record accurately reflects my personal performance of the history, physical exam, assessment and plan. I have also personally directed, reviewed, and agreed with the chart. [Time Spent: ___ minutes] : I have spent [unfilled] minutes of time on the encounter which excludes teaching and separately reported services.

## 2024-10-06 NOTE — HISTORY OF PRESENT ILLNESS
[de-identified] : DEEPTI RDZ is a 43-year-old female who presents for a follow-up for left shoulder pain. Last visit was on 08/16/2024. She is still experiencing sharp pain. Pain persisted for three weeks, preventing work. The patient has been undergoing therapy for the 3 weeks. No current medications are being taken for pain, despite a history of medication use for 18 and 21 months. The patient reports a little numbness or tingling in the arm. The previous injection with Dr. Hansen in May was ineffective. The patient is considering a different injection today, with potential surgical consultation if no improvement is noted. No recent vaccines within last 14 days.

## 2024-10-06 NOTE — HISTORY OF PRESENT ILLNESS
[de-identified] : DEEPTI RDZ is a 43-year-old female who presents for a follow-up for left shoulder pain. Last visit was on 08/16/2024. She is still experiencing sharp pain. Pain persisted for three weeks, preventing work. The patient has been undergoing therapy for the 3 weeks. No current medications are being taken for pain, despite a history of medication use for 18 and 21 months. The patient reports a little numbness or tingling in the arm. The previous injection with Dr. Hansen in May was ineffective. The patient is considering a different injection today, with potential surgical consultation if no improvement is noted. No recent vaccines within last 14 days.

## 2025-02-24 ENCOUNTER — APPOINTMENT (OUTPATIENT)
Dept: ORTHOPEDIC SURGERY | Facility: CLINIC | Age: 45
End: 2025-02-24